# Patient Record
Sex: MALE | Race: WHITE | NOT HISPANIC OR LATINO | URBAN - METROPOLITAN AREA
[De-identification: names, ages, dates, MRNs, and addresses within clinical notes are randomized per-mention and may not be internally consistent; named-entity substitution may affect disease eponyms.]

---

## 2017-01-20 ENCOUNTER — OUTPATIENT (OUTPATIENT)
Dept: OUTPATIENT SERVICES | Facility: HOSPITAL | Age: 62
LOS: 1 days | End: 2017-01-20
Payer: COMMERCIAL

## 2017-01-20 VITALS
RESPIRATION RATE: 16 BRPM | WEIGHT: 199.96 LBS | DIASTOLIC BLOOD PRESSURE: 64 MMHG | HEIGHT: 72 IN | SYSTOLIC BLOOD PRESSURE: 120 MMHG | TEMPERATURE: 98 F | HEART RATE: 69 BPM | OXYGEN SATURATION: 98 %

## 2017-01-20 DIAGNOSIS — Z90.49 ACQUIRED ABSENCE OF OTHER SPECIFIED PARTS OF DIGESTIVE TRACT: Chronic | ICD-10-CM

## 2017-01-20 DIAGNOSIS — I35.0 NONRHEUMATIC AORTIC (VALVE) STENOSIS: ICD-10-CM

## 2017-01-20 DIAGNOSIS — Z98.890 OTHER SPECIFIED POSTPROCEDURAL STATES: Chronic | ICD-10-CM

## 2017-01-20 DIAGNOSIS — Z96.659 PRESENCE OF UNSPECIFIED ARTIFICIAL KNEE JOINT: Chronic | ICD-10-CM

## 2017-01-20 LAB
ALBUMIN SERPL ELPH-MCNC: 4.1 G/DL — SIGNIFICANT CHANGE UP (ref 3.3–5)
ALP SERPL-CCNC: 66 U/L — SIGNIFICANT CHANGE UP (ref 40–120)
ALT FLD-CCNC: 22 U/L RC — SIGNIFICANT CHANGE UP (ref 10–45)
ANION GAP SERPL CALC-SCNC: 11 MMOL/L — SIGNIFICANT CHANGE UP (ref 5–17)
AST SERPL-CCNC: 28 U/L — SIGNIFICANT CHANGE UP (ref 10–40)
BILIRUB SERPL-MCNC: 0.5 MG/DL — SIGNIFICANT CHANGE UP (ref 0.2–1.2)
BUN SERPL-MCNC: 24 MG/DL — HIGH (ref 7–23)
CALCIUM SERPL-MCNC: 9.1 MG/DL — SIGNIFICANT CHANGE UP (ref 8.4–10.5)
CHLORIDE SERPL-SCNC: 106 MMOL/L — SIGNIFICANT CHANGE UP (ref 96–108)
CO2 SERPL-SCNC: 23 MMOL/L — SIGNIFICANT CHANGE UP (ref 22–31)
CREAT SERPL-MCNC: 0.98 MG/DL — SIGNIFICANT CHANGE UP (ref 0.5–1.3)
GLUCOSE SERPL-MCNC: 115 MG/DL — HIGH (ref 70–99)
HCT VFR BLD CALC: 44.9 % — SIGNIFICANT CHANGE UP (ref 39–50)
HGB BLD-MCNC: 15 G/DL — SIGNIFICANT CHANGE UP (ref 13–17)
MCHC RBC-ENTMCNC: 31.1 PG — SIGNIFICANT CHANGE UP (ref 27–34)
MCHC RBC-ENTMCNC: 33.3 GM/DL — SIGNIFICANT CHANGE UP (ref 32–36)
MCV RBC AUTO: 93.4 FL — SIGNIFICANT CHANGE UP (ref 80–100)
PLATELET # BLD AUTO: 202 K/UL — SIGNIFICANT CHANGE UP (ref 150–400)
POTASSIUM SERPL-MCNC: 4.6 MMOL/L — SIGNIFICANT CHANGE UP (ref 3.5–5.3)
POTASSIUM SERPL-SCNC: 4.6 MMOL/L — SIGNIFICANT CHANGE UP (ref 3.5–5.3)
PROT SERPL-MCNC: 7.7 G/DL — SIGNIFICANT CHANGE UP (ref 6–8.3)
RBC # BLD: 4.81 M/UL — SIGNIFICANT CHANGE UP (ref 4.2–5.8)
RBC # FLD: 11.8 % — SIGNIFICANT CHANGE UP (ref 10.3–14.5)
SODIUM SERPL-SCNC: 140 MMOL/L — SIGNIFICANT CHANGE UP (ref 135–145)
WBC # BLD: 7.2 K/UL — SIGNIFICANT CHANGE UP (ref 3.8–10.5)
WBC # FLD AUTO: 7.2 K/UL — SIGNIFICANT CHANGE UP (ref 3.8–10.5)

## 2017-01-20 PROCEDURE — 76937 US GUIDE VASCULAR ACCESS: CPT

## 2017-01-20 PROCEDURE — 93460 R&L HRT ART/VENTRICLE ANGIO: CPT | Mod: 26,GC

## 2017-01-20 PROCEDURE — 93005 ELECTROCARDIOGRAM TRACING: CPT

## 2017-01-20 PROCEDURE — 93010 ELECTROCARDIOGRAM REPORT: CPT

## 2017-01-20 PROCEDURE — 80053 COMPREHEN METABOLIC PANEL: CPT

## 2017-01-20 PROCEDURE — 76937 US GUIDE VASCULAR ACCESS: CPT | Mod: 26,GC

## 2017-01-20 PROCEDURE — 85027 COMPLETE CBC AUTOMATED: CPT

## 2017-01-20 PROCEDURE — 93460 R&L HRT ART/VENTRICLE ANGIO: CPT

## 2017-01-20 NOTE — H&P CARDIOLOGY - HISTORY OF PRESENT ILLNESS
60 y/o Male with PMHx of, AS,  HTN, BPH, Arthritis presents with c/o CEJA, progressing over last 3 months and ent has diagnosed with AS, which is monitored by Cardiologist, Dr. Preston and the last Echo revealed the AS ( LESLEY is 0.82). Seen evuated  by cardiologist & recommends  or cardiac cath. 60 y/o Male with PMHx of, AS,  HTN, BPH, Arthritis presents with c/o CEJA, progressing over last 3 months and c/o occasional dizziness. Patient was diagnosed with AS, which is monitored by Cardiologist, Dr. Preston and the last Echo revealed the AS ( LESLEY is 0.82). Seen evaluated by cardiologist & recommends  or cardiac cath.

## 2017-01-20 NOTE — H&P CARDIOLOGY - PMH
Aortic stenosis    Arthritis    BPH (benign prostatic hyperplasia)    Guillain Barré syndrome  ( resolved)  HTN (hypertension)    HTN (hypertension)    Lupus    Varicose vein of leg

## 2017-01-24 ENCOUNTER — TRANSCRIPTION ENCOUNTER (OUTPATIENT)
Age: 62
End: 2017-01-24

## 2019-05-15 ENCOUNTER — INPATIENT (INPATIENT)
Facility: HOSPITAL | Age: 64
LOS: 4 days | Discharge: HOME | DRG: 195 | End: 2019-05-20
Attending: INTERNAL MEDICINE | Admitting: INTERNAL MEDICINE
Payer: COMMERCIAL

## 2019-05-15 VITALS
TEMPERATURE: 98 F | SYSTOLIC BLOOD PRESSURE: 121 MMHG | HEIGHT: 72 IN | RESPIRATION RATE: 20 BRPM | DIASTOLIC BLOOD PRESSURE: 73 MMHG | WEIGHT: 192.02 LBS | OXYGEN SATURATION: 96 % | HEART RATE: 90 BPM

## 2019-05-15 DIAGNOSIS — Z96.659 PRESENCE OF UNSPECIFIED ARTIFICIAL KNEE JOINT: Chronic | ICD-10-CM

## 2019-05-15 DIAGNOSIS — Z90.49 ACQUIRED ABSENCE OF OTHER SPECIFIED PARTS OF DIGESTIVE TRACT: Chronic | ICD-10-CM

## 2019-05-15 DIAGNOSIS — Z98.890 OTHER SPECIFIED POSTPROCEDURAL STATES: Chronic | ICD-10-CM

## 2019-05-15 LAB
ALBUMIN SERPL ELPH-MCNC: 4 G/DL — SIGNIFICANT CHANGE UP (ref 3.3–5)
ALP SERPL-CCNC: 57 U/L — SIGNIFICANT CHANGE UP (ref 40–120)
ALT FLD-CCNC: 39 U/L — SIGNIFICANT CHANGE UP (ref 10–45)
ANION GAP SERPL CALC-SCNC: 13 MMOL/L — SIGNIFICANT CHANGE UP (ref 5–17)
AST SERPL-CCNC: 39 U/L — SIGNIFICANT CHANGE UP (ref 10–40)
BILIRUB SERPL-MCNC: 1.7 MG/DL — HIGH (ref 0.2–1.2)
BUN SERPL-MCNC: 23 MG/DL — SIGNIFICANT CHANGE UP (ref 7–23)
CALCIUM SERPL-MCNC: 9.4 MG/DL — SIGNIFICANT CHANGE UP (ref 8.4–10.5)
CHLORIDE SERPL-SCNC: 99 MMOL/L — SIGNIFICANT CHANGE UP (ref 96–108)
CO2 SERPL-SCNC: 21 MMOL/L — LOW (ref 22–31)
CREAT SERPL-MCNC: 1.07 MG/DL — SIGNIFICANT CHANGE UP (ref 0.5–1.3)
GAS PNL BLDV: SIGNIFICANT CHANGE UP
GLUCOSE SERPL-MCNC: 117 MG/DL — HIGH (ref 70–99)
HCT VFR BLD CALC: 43.9 % — SIGNIFICANT CHANGE UP (ref 39–50)
HGB BLD-MCNC: 14.8 G/DL — SIGNIFICANT CHANGE UP (ref 13–17)
MCHC RBC-ENTMCNC: 31.5 PG — SIGNIFICANT CHANGE UP (ref 27–34)
MCHC RBC-ENTMCNC: 33.6 GM/DL — SIGNIFICANT CHANGE UP (ref 32–36)
MCV RBC AUTO: 93.6 FL — SIGNIFICANT CHANGE UP (ref 80–100)
PLATELET # BLD AUTO: 198 K/UL — SIGNIFICANT CHANGE UP (ref 150–400)
POTASSIUM SERPL-MCNC: 4.3 MMOL/L — SIGNIFICANT CHANGE UP (ref 3.5–5.3)
POTASSIUM SERPL-SCNC: 4.3 MMOL/L — SIGNIFICANT CHANGE UP (ref 3.5–5.3)
PROT SERPL-MCNC: 7.6 G/DL — SIGNIFICANT CHANGE UP (ref 6–8.3)
RBC # BLD: 4.68 M/UL — SIGNIFICANT CHANGE UP (ref 4.2–5.8)
RBC # FLD: 12.1 % — SIGNIFICANT CHANGE UP (ref 10.3–14.5)
SODIUM SERPL-SCNC: 133 MMOL/L — LOW (ref 135–145)
WBC # BLD: 17.4 K/UL — HIGH (ref 3.8–10.5)
WBC # FLD AUTO: 17.4 K/UL — HIGH (ref 3.8–10.5)

## 2019-05-15 PROCEDURE — 99285 EMERGENCY DEPT VISIT HI MDM: CPT | Mod: 25

## 2019-05-15 PROCEDURE — 71046 X-RAY EXAM CHEST 2 VIEWS: CPT | Mod: 26

## 2019-05-15 PROCEDURE — 93010 ELECTROCARDIOGRAM REPORT: CPT

## 2019-05-15 RX ORDER — SODIUM CHLORIDE 9 MG/ML
1000 INJECTION INTRAMUSCULAR; INTRAVENOUS; SUBCUTANEOUS ONCE
Refills: 0 | Status: COMPLETED | OUTPATIENT
Start: 2019-05-15 | End: 2019-05-15

## 2019-05-15 RX ORDER — ACETAMINOPHEN 500 MG
975 TABLET ORAL ONCE
Refills: 0 | Status: COMPLETED | OUTPATIENT
Start: 2019-05-15 | End: 2019-05-15

## 2019-05-15 RX ADMIN — Medication 975 MILLIGRAM(S): at 23:22

## 2019-05-15 RX ADMIN — SODIUM CHLORIDE 2000 MILLILITER(S): 9 INJECTION INTRAMUSCULAR; INTRAVENOUS; SUBCUTANEOUS at 23:22

## 2019-05-15 NOTE — ED CLERICAL - NS ED CLERK NOTE PRE-ARRIVAL INFORMATION; ADDITIONAL PRE-ARRIVAL INFORMATION
CC/Reason For referral: fever, productive cough   Preferred Consultant(if applicable): na  Who admits for you (if needed): hospitalist  Do you have documents you would like to fax over?no  Would you still like to speak to an ED attending? Call after pt evaluated  pt has completed dose or tamiflu

## 2019-05-15 NOTE — ED ADULT NURSE NOTE - OBJECTIVE STATEMENT
63y male arrived to ED complaining of SOB. Patient PMHx SLE, arthritis, GBS, AVR. Patient sent to ED by PMD for concern PNA. Patient recently tx empirically for flu with tamiflu. Patient reports that cough, weakness, CEJA worsened throughout the week. Patient denies CP, n/v/d, ab pain, chills, fever. Patient A&Ox4, ambulatory, VS stable.

## 2019-05-15 NOTE — ED PROVIDER NOTE - OBJECTIVE STATEMENT
63 yoM, PMHx of SLE, arthritis, remote GBS, s/p bioprosthetic AVR presents to ED after being sent in by PCP (Dr. Alfaro) for concern for post viral PNA. Sick last week with viral syndrome, Given Tamiflu for clinical flu on Wednesday 1 week ago, finished 2 days ago. Was feeling a little better but yesterday sx returned and more significant. Cough, dyspnea, weakness. 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 No vomiting or diarrhea. Started on Levaquin this afternoon but upon being seen again by PCP (neighbor) referred to ED for admission given worsening clinical status.

## 2019-05-15 NOTE — ED PROVIDER NOTE - CLINICAL SUMMARY MEDICAL DECISION MAKING FREE TEXT BOX
Abad PGY1 63 yoM, SLE, arthritis, s/p AVR, remote GBS, 1 week of viral syndrome, s/p tamilfu, worse sx yesterday, started levaquin, not improving, concern for post viral PNA  RLL rhonchi, looks ill, abd soft, normal neuro exam  levaquin, ivf, tylenol, labs, admit to hospitalist per Dr. Alfaro

## 2019-05-15 NOTE — ED PROVIDER NOTE - ATTENDING CONTRIBUTION TO CARE
Patient is a 62 yo M with history of lupus, HTN, s/p prosthetic AVR s/p treatment with tamiflu for viral syndrome now p/w cough, shortness of breath and weakness. Patient sent in by his pcp, Dr. Alfaro for concern for pneumonia.     VS noted  Gen. no acute distress, Non toxic   HEENT: EOMI, mmm,   Lungs:   CVS: RRR   Abd; Soft non tender, non distended   Ext: no edema  Skin: no rash  Neuro AAOx3 non focal clear speech  a/p: r/o pneumonia - labs, CXR, ekg, Abx and likely admit.   - Sahara SARAVIA Patient is a 62 yo M with history of lupus, HTN, s/p prosthetic AVR s/p treatment with tamiflu for viral syndrome now p/w cough, shortness of breath and weakness. Patient sent in by his pcp, Dr. Alfaro for concern for pneumonia.     VS noted  Gen. no acute distress, Non toxic   HEENT: EOMI, mmm,   Lungs: rhonchi in RLL  CVS: RRR   Abd; Soft non tender, non distended   Ext: no edema  Skin: no rash  Neuro AAOx3 non focal clear speech  a/p:  pneumonia - labs, CXR, ekg, Abx and admit.   - Sahara SARAVIA Patient is a 62 yo M with history of lupus, HTN, s/p prosthetic AVR s/p treatment with tamiflu for viral syndrome now p/w cough, shortness of breath and weakness. Patient had fevers last week from Wednesday to Saturday. He states he went to work today and was wiped out and left early to see his pcp. An outpatient CXR was done which showed pneumonia in the RLL. Patient sent in by his pcp, Dr. Alfaro for pneumonia and IV antibiotics.     VS noted  Gen. no acute distress, Non toxic   HEENT: EOMI, mmm  Lungs: rhonchi in RLL  CVS: RRR   Abd; Soft non tender, non distended   Ext: no edema  Skin: no rash  Neuro AAOx3 non focal clear speech  a/p:  pneumonia - labs, CXR, ekg, Abx and admit.   - Sahara SARAVIA

## 2019-05-15 NOTE — ED PROVIDER NOTE - PROGRESS NOTE DETAILS
Dr. Alfaro called and asked that patient be admitted to him, but ask hospitalist to cover. Haverty PGY!- d/w with Dr. Villela, will do admission for Dr. Alfaro, requests legionella antigen sent

## 2019-05-15 NOTE — ED PROVIDER NOTE - PHYSICAL EXAMINATION
PHYSICAL EXAM:  GENERAL: looks uncomfortable, well-groomed, well-developed  HEAD:  Atraumatic, Normocephalic  EYES: EOMI, PERRLA, conjunctiva and sclera clear  ENMT: airway patent, uvula midline  NECK: Supple, No JVD  HEART: Regular rate and rhythm; No murmurs, rubs, or gallops  RESPIRATORY: rhonchi RLL, coughing, no resp distress   ABDOMEN: Soft, Nontender, Nondistended  NEURO: A&Ox3, nonfocal, moving all extremities  EXTREMITIES:  2+ Peripheral Pulses, No clubbing, cyanosis, or edema  SKIN: warm, dry, normal color, no rash

## 2019-05-16 DIAGNOSIS — J18.1 LOBAR PNEUMONIA, UNSPECIFIED ORGANISM: ICD-10-CM

## 2019-05-16 DIAGNOSIS — J18.9 PNEUMONIA, UNSPECIFIED ORGANISM: ICD-10-CM

## 2019-05-16 DIAGNOSIS — Z29.9 ENCOUNTER FOR PROPHYLACTIC MEASURES, UNSPECIFIED: ICD-10-CM

## 2019-05-16 DIAGNOSIS — Z95.4 PRESENCE OF OTHER HEART-VALVE REPLACEMENT: ICD-10-CM

## 2019-05-16 PROBLEM — M19.90 UNSPECIFIED OSTEOARTHRITIS, UNSPECIFIED SITE: Chronic | Status: ACTIVE | Noted: 2017-01-20

## 2019-05-16 PROBLEM — M32.9 SYSTEMIC LUPUS ERYTHEMATOSUS, UNSPECIFIED: Chronic | Status: ACTIVE | Noted: 2017-01-20

## 2019-05-16 PROBLEM — G61.0 GUILLAIN-BARRE SYNDROME: Chronic | Status: ACTIVE | Noted: 2017-01-20

## 2019-05-16 PROBLEM — I35.0 NONRHEUMATIC AORTIC (VALVE) STENOSIS: Chronic | Status: ACTIVE | Noted: 2017-01-20

## 2019-05-16 PROBLEM — N40.0 BENIGN PROSTATIC HYPERPLASIA WITHOUT LOWER URINARY TRACT SYMPTOMS: Chronic | Status: ACTIVE | Noted: 2017-01-20

## 2019-05-16 PROBLEM — I10 ESSENTIAL (PRIMARY) HYPERTENSION: Chronic | Status: ACTIVE | Noted: 2017-01-20

## 2019-05-16 PROBLEM — I83.90 ASYMPTOMATIC VARICOSE VEINS OF UNSPECIFIED LOWER EXTREMITY: Chronic | Status: ACTIVE | Noted: 2017-01-20

## 2019-05-16 LAB
ANION GAP SERPL CALC-SCNC: 9 MMOL/L — SIGNIFICANT CHANGE UP (ref 5–17)
BASOPHILS # BLD AUTO: 0 K/UL — SIGNIFICANT CHANGE UP (ref 0–0.2)
BASOPHILS # BLD AUTO: 0 K/UL — SIGNIFICANT CHANGE UP (ref 0–0.2)
BASOPHILS NFR BLD AUTO: 0 % — SIGNIFICANT CHANGE UP (ref 0–2)
BUN SERPL-MCNC: 18 MG/DL — SIGNIFICANT CHANGE UP (ref 7–23)
CALCIUM SERPL-MCNC: 9 MG/DL — SIGNIFICANT CHANGE UP (ref 8.4–10.5)
CHLORIDE SERPL-SCNC: 104 MMOL/L — SIGNIFICANT CHANGE UP (ref 96–108)
CO2 SERPL-SCNC: 22 MMOL/L — SIGNIFICANT CHANGE UP (ref 22–31)
CREAT SERPL-MCNC: 0.99 MG/DL — SIGNIFICANT CHANGE UP (ref 0.5–1.3)
EOSINOPHIL # BLD AUTO: 0 K/UL — SIGNIFICANT CHANGE UP (ref 0–0.5)
EOSINOPHIL # BLD AUTO: 0.1 K/UL — SIGNIFICANT CHANGE UP (ref 0–0.5)
EOSINOPHIL NFR BLD AUTO: 0 % — SIGNIFICANT CHANGE UP (ref 0–6)
GLUCOSE SERPL-MCNC: 111 MG/DL — HIGH (ref 70–99)
HCT VFR BLD CALC: 42.2 % — SIGNIFICANT CHANGE UP (ref 39–50)
HGB BLD-MCNC: 14.1 G/DL — SIGNIFICANT CHANGE UP (ref 13–17)
LEGIONELLA AG UR QL: NEGATIVE — SIGNIFICANT CHANGE UP
LYMPHOCYTES # BLD AUTO: 1.2 K/UL — SIGNIFICANT CHANGE UP (ref 1–3.3)
LYMPHOCYTES # BLD AUTO: 1.88 K/UL — SIGNIFICANT CHANGE UP (ref 1–3.3)
LYMPHOCYTES # BLD AUTO: 11.2 % — LOW (ref 13–44)
LYMPHOCYTES # BLD AUTO: 7 % — LOW (ref 13–44)
MCHC RBC-ENTMCNC: 31.1 PG — SIGNIFICANT CHANGE UP (ref 27–34)
MCHC RBC-ENTMCNC: 33.4 GM/DL — SIGNIFICANT CHANGE UP (ref 32–36)
MCV RBC AUTO: 93 FL — SIGNIFICANT CHANGE UP (ref 80–100)
MONOCYTES # BLD AUTO: 1.16 K/UL — HIGH (ref 0–0.9)
MONOCYTES # BLD AUTO: 1.4 K/UL — HIGH (ref 0–0.9)
MONOCYTES NFR BLD AUTO: 6.9 % — SIGNIFICANT CHANGE UP (ref 2–14)
MONOCYTES NFR BLD AUTO: 8 % — SIGNIFICANT CHANGE UP (ref 2–14)
NEUTROPHILS # BLD AUTO: 13.78 K/UL — HIGH (ref 1.8–7.4)
NEUTROPHILS # BLD AUTO: 14.7 K/UL — HIGH (ref 1.8–7.4)
NEUTROPHILS NFR BLD AUTO: 77 % — SIGNIFICANT CHANGE UP (ref 43–77)
NEUTROPHILS NFR BLD AUTO: 79.3 % — HIGH (ref 43–77)
PLATELET # BLD AUTO: 170 K/UL — SIGNIFICANT CHANGE UP (ref 150–400)
POTASSIUM SERPL-MCNC: 3.9 MMOL/L — SIGNIFICANT CHANGE UP (ref 3.5–5.3)
POTASSIUM SERPL-SCNC: 3.9 MMOL/L — SIGNIFICANT CHANGE UP (ref 3.5–5.3)
RAPID RVP RESULT: SIGNIFICANT CHANGE UP
RBC # BLD: 4.54 M/UL — SIGNIFICANT CHANGE UP (ref 4.2–5.8)
RBC # FLD: 13.2 % — SIGNIFICANT CHANGE UP (ref 10.3–14.5)
SODIUM SERPL-SCNC: 135 MMOL/L — SIGNIFICANT CHANGE UP (ref 135–145)
WBC # BLD: 16.83 K/UL — HIGH (ref 3.8–10.5)
WBC # FLD AUTO: 16.83 K/UL — HIGH (ref 3.8–10.5)

## 2019-05-16 PROCEDURE — 99223 1ST HOSP IP/OBS HIGH 75: CPT

## 2019-05-16 RX ORDER — ASPIRIN/CALCIUM CARB/MAGNESIUM 324 MG
81 TABLET ORAL DAILY
Refills: 0 | Status: DISCONTINUED | OUTPATIENT
Start: 2019-05-16 | End: 2019-05-20

## 2019-05-16 RX ORDER — POLYETHYLENE GLYCOL 3350 17 G/17G
17 POWDER, FOR SOLUTION ORAL DAILY
Refills: 0 | Status: DISCONTINUED | OUTPATIENT
Start: 2019-05-16 | End: 2019-05-20

## 2019-05-16 RX ORDER — DOCUSATE SODIUM 100 MG
100 CAPSULE ORAL THREE TIMES A DAY
Refills: 0 | Status: DISCONTINUED | OUTPATIENT
Start: 2019-05-16 | End: 2019-05-20

## 2019-05-16 RX ORDER — IBUPROFEN 200 MG
600 TABLET ORAL ONCE
Refills: 0 | Status: COMPLETED | OUTPATIENT
Start: 2019-05-16 | End: 2019-05-16

## 2019-05-16 RX ORDER — ENOXAPARIN SODIUM 100 MG/ML
40 INJECTION SUBCUTANEOUS EVERY 24 HOURS
Refills: 0 | Status: DISCONTINUED | OUTPATIENT
Start: 2019-05-16 | End: 2019-05-20

## 2019-05-16 RX ORDER — RAMIPRIL 5 MG
0 CAPSULE ORAL
Qty: 0 | Refills: 0 | DISCHARGE

## 2019-05-16 RX ORDER — SENNA PLUS 8.6 MG/1
2 TABLET ORAL AT BEDTIME
Refills: 0 | Status: DISCONTINUED | OUTPATIENT
Start: 2019-05-16 | End: 2019-05-20

## 2019-05-16 RX ORDER — VANCOMYCIN HCL 1 G
1250 VIAL (EA) INTRAVENOUS EVERY 12 HOURS
Refills: 0 | Status: DISCONTINUED | OUTPATIENT
Start: 2019-05-16 | End: 2019-05-18

## 2019-05-16 RX ORDER — ACETAMINOPHEN 500 MG
650 TABLET ORAL EVERY 6 HOURS
Refills: 0 | Status: DISCONTINUED | OUTPATIENT
Start: 2019-05-16 | End: 2019-05-20

## 2019-05-16 RX ORDER — TAMSULOSIN HYDROCHLORIDE 0.4 MG/1
1 CAPSULE ORAL
Qty: 0 | Refills: 0 | DISCHARGE

## 2019-05-16 RX ORDER — SODIUM CHLORIDE 9 MG/ML
1000 INJECTION INTRAMUSCULAR; INTRAVENOUS; SUBCUTANEOUS
Refills: 0 | Status: DISCONTINUED | OUTPATIENT
Start: 2019-05-16 | End: 2019-05-20

## 2019-05-16 RX ADMIN — Medication 100 MILLIGRAM(S): at 11:44

## 2019-05-16 RX ADMIN — Medication 650 MILLIGRAM(S): at 11:44

## 2019-05-16 RX ADMIN — Medication 600 MILLIGRAM(S): at 01:03

## 2019-05-16 RX ADMIN — Medication 650 MILLIGRAM(S): at 12:40

## 2019-05-16 RX ADMIN — ENOXAPARIN SODIUM 40 MILLIGRAM(S): 100 INJECTION SUBCUTANEOUS at 17:29

## 2019-05-16 RX ADMIN — SODIUM CHLORIDE 100 MILLILITER(S): 9 INJECTION INTRAMUSCULAR; INTRAVENOUS; SUBCUTANEOUS at 06:05

## 2019-05-16 RX ADMIN — SENNA PLUS 2 TABLET(S): 8.6 TABLET ORAL at 23:09

## 2019-05-16 RX ADMIN — Medication 166.67 MILLIGRAM(S): at 06:12

## 2019-05-16 RX ADMIN — Medication 650 MILLIGRAM(S): at 18:46

## 2019-05-16 RX ADMIN — Medication 100 MILLIGRAM(S): at 23:08

## 2019-05-16 RX ADMIN — Medication 166.67 MILLIGRAM(S): at 17:55

## 2019-05-16 RX ADMIN — Medication 81 MILLIGRAM(S): at 11:44

## 2019-05-16 RX ADMIN — SODIUM CHLORIDE 1000 MILLILITER(S): 9 INJECTION INTRAMUSCULAR; INTRAVENOUS; SUBCUTANEOUS at 00:20

## 2019-05-16 RX ADMIN — Medication 650 MILLIGRAM(S): at 17:53

## 2019-05-16 RX ADMIN — Medication 975 MILLIGRAM(S): at 00:20

## 2019-05-16 NOTE — CONSULT NOTE ADULT - SUBJECTIVE AND OBJECTIVE BOX
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      PMHX:  Lupus  Arthritis  HTN (hypertension)  Varicose vein of leg  Aortic stenosis  HTN (hypertension)  BPH (benign prostatic hyperplasia)  Guillain Barré syndrome      PSHX:  Inguinal hernia repair  Total knee arthroplasty  Cholecystectomy      FAMILY HISTORY:  mother - lung cancer    SOCIAL HISTORY:  physical therapist    Pulmonary Medications:       Antimicrobials:  levoFLOXacin IVPB 750 milliGRAM(s) IV Intermittent every 24 hours  vancomycin  IVPB 1250 milliGRAM(s) IV Intermittent every 12 hours      Cardiology:      Other:  aspirin enteric coated 81 milliGRAM(s) Oral daily  docusate sodium 100 milliGRAM(s) Oral three times a day  enoxaparin Injectable 40 milliGRAM(s) SubCutaneous every 24 hours  senna 2 Tablet(s) Oral at bedtime  sodium chloride 0.9%. 1000 milliLiter(s) IV Continuous <Continuous>      Prn:  MEDICATIONS  (PRN):  acetaminophen   Tablet .. 650 milliGRAM(s) Oral every 6 hours PRN Moderate Pain (4 - 6)  polyethylene glycol 3350 17 Gram(s) Oral daily PRN Constipation      Allergies    penicillins (Other)    HOME MEDICATIONS: see  H & P    REVIEW OF SYSTEMS:  Constitutional: As per HPI  HEENT: Within normal limits  CV: As per HPI  Resp: As per HPI  GI: Within normal limits   : Within normal limits  Musculoskeletal: Within normal limits  Skin: Within normal limits  Neurological: Within normal limits  Psychiatric: Within normal limits  Endocrine: Within normal limits  Hematologic/Lymphatic: Within normal limits  Allergic/Immunologic: Within normal limits    [x] All other systems negative    OBJECTIVE:    I&O's Detail    16 May 2019 07:01  -  16 May 2019 15:06  --------------------------------------------------------  IN:    Oral Fluid: 600 mL  Total IN: 600 mL    OUT:  Total OUT: 0 mL    Total NET: 600 mL    Daily Height in cm: 182.88 (16 May 2019 07:55)    Daily Weight in k.1 (16 May 2019 07:55)      PHYSICAL EXAM:  ICU Vital Signs Last 24 Hrs  T(C): 37 (16 May 2019 14:04), Max: 39.1 (16 May 2019 00:10)  T(F): 98.6 (16 May 2019 14:04), Max: 102.3 (16 May 2019 00:10)  HR: 83 (16 May 2019 14:04) (82 - 94)  BP: 111/68 (16 May 2019 14:04) (111/68 - 126/76)  BP(mean): --  ABP: --  ABP(mean): --  RR: 18 (16 May 2019 14:04) (17 - 20)  SpO2: 93% (16 May 2019 14:04) (93% - 96%)    General: Awake. Alert. Cooperative. No distress. Appears stated age 	  HEENT:   Atraumatic. Normocephalic. Anicteric. Normal oral mucosa. PERRL. EOMI.  Neck: Supple. Trachea midline. Thyroid without enlargement/tenderness/nodules. No carotid bruit. No JVD.	  Cardiovascular: Regular rate and rhythm. S1 S2 normal. No murmurs, rubs or gallops.  Respiratory: Respirations unlabored. Clear to auscultation and percussion bilaterally. No curvature.  Abdomen: Soft. Non-tender. Non-distended. No organomegaly. No masses. Normal bowel sounds.  Extremities: Warm to touch. No clubbing or cyanosis. No pedal edema.  Pulses: 2+ peripheral pulses all extremities.	  Skin: Normal skin color. No rashes or lesions. No ecchymoses. No cyanosis. Warm to touch.  Lymph Nodes: Cervical, supraclavicular and axillary nodes normal  Neurological: Motor and sensory examination equal and normal. A and O x 3  Psychiatry: Appropriate mood and affect.      LABS:                          14.1   16.83 )-----------( 170      ( 16 May 2019 09:02 )             42.2     CBC    WBC  16.83 <==, 17.4 <==    Hemoglobin  14.1 <<==, 14.8 <<==    Hematocrit  42.2 <==, 43.9 <==    Platelets  170 <==, 198 <==      135  |  104  |  18  ----------------------------<  111<H>    05-16  3.9   |  22  |  0.99    LYTES    sodium  135 <==, 133 <==    potassium   3.9 <==, 4.3 <==    chloride  104 <==, 99 <==    carbon dioxide  22 <==, 21 <==    =============================================================================================  RENAL FUNCTION:    Creatinine:   0.99  <<==, 1.07  <<==    BUN:   18 <==, 23 <==    ============================================================================================    calcium   9.0 <==, 9.4 <==    phos       mag       ============================================================================================  LFTs    AST:   39 <==     ALT:  39  <==     AP:  57  <=    Bili:  1.7  <=      Venous Blood Gas:  05-15 @ 23:07  7.43/37/43/24/76  VBG Lactate: 2.1                  MICROBIOLOGY:     Rapid Respiratory Viral Panel (19 @ 00:12)    Rapid RVP Result: NotDetec: This Respiratory Panel uses polymerase chain reaction (PCR) to detect for  adenovirus; coronavirus (HKU1, NL63, 229E, OC43); human metapneumovirus  (hMPV); human enterovirus/rhinovirus (Entero/RV); influenza A; influenza  A/H1; influenza A/H3; influenza A/H1-2009; influenza B; parainfluenza  viruses 1, 2, 3, 4; respiratory syncytial virus; Mycoplasma pneumoniae;  and Chlamydophila pneumoniae.    RADIOLOGY:  [x ] Chest radiographs reviewed and interpreted by me    EXAM:  XR CHEST PA LAT 2V                          PROCEDURE DATE:  05/15/2019      INTERPRETATION:  CLINICAL INFORMATION: cough    EXAM: PA and lateral chest radiographs    COMPARISON: Chest radiograph from 2008.    FINDINGS:    Status post median sternotomy and aortic valve replacement. Retained   epicardial pacer leads are noted.  Right lower lobe opacity consistent with pneumonia.  The cardiac silhouette is within normal limits.  There is no acute abnormality of the visualized osseous structures.    IMPRESSION:     Right lower lobe pneumonia. Follow-up to resolution.    MARC AREVALO M.D., RADIOLOGY RESIDENT  This document has been electronically signed.  HAYLIE DYSON M.D., ATTENDING RADIOLOGIST  This document has been electronically signed. May 16 2019  8:56AM  --------------------------------------------------------------------------------------------------------------- [facility] - [facility] - CONSULT NOTE    HPI: 63 year old gentleman, lifelong non-smoker, with history of "SLE" based on a malar rash and positive MANUEL - he has never had arthritis and is not maintained on chronic immunosuppressive therapy. 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 The patient developed fatigue, malaise and weakness associated with rhinorrhea, nasal congestion, post nasal drip and cough ~ 1 week ago while at work. He was prescribed Tamiflu over the phone for presumed influenza with improvement in his symptoms over the next several days. Yesterday, the patient again developed fatigue, malaise, muscle aches and weakness now associated with a hacking cough productive of scant sputum, right sided chest pain exacerbated by deep inspiration, fever -> 103.5 degrees and chills. He was seen by Dr. Alfaro and referred to the ER after CXR revealed pneumonia. He has no shortness of breath, chest congestion or wheeze. No anterior chest pain/pressure or palpitations. Asked to evaluate    PMHX:  Aortic stenosis  Lupus  Arthritis  HTN (hypertension)  Varicose vein of leg  BPH (benign prostatic hyperplasia)  Guillain Barré syndrome      PSHX:  Inguinal hernia repair  Arthroscopy - ACL repair  Cholecystectomy      FAMILY HISTORY:  mother - lung cancer    SOCIAL HISTORY:  physical therapist    Pulmonary Medications:       Antimicrobials:  levoFLOXacin IVPB 750 milliGRAM(s) IV Intermittent every 24 hours  vancomycin  IVPB 1250 milliGRAM(s) IV Intermittent every 12 hours      Cardiology:      Other:  aspirin enteric coated 81 milliGRAM(s) Oral daily  docusate sodium 100 milliGRAM(s) Oral three times a day  enoxaparin Injectable 40 milliGRAM(s) SubCutaneous every 24 hours  senna 2 Tablet(s) Oral at bedtime  sodium chloride 0.9%. 1000 milliLiter(s) IV Continuous <Continuous>      Prn:  MEDICATIONS  (PRN):  acetaminophen   Tablet .. 650 milliGRAM(s) Oral every 6 hours PRN Moderate Pain (4 - 6)  polyethylene glycol 3350 17 Gram(s) Oral daily PRN Constipation      Allergies    penicillins (Other)    HOME MEDICATIONS: see  H & P    REVIEW OF SYSTEMS:  Constitutional: As per HPI  HEENT: Within normal limits  CV: As per HPI  Resp: As per HPI  GI: Within normal limits   : Within normal limits  Musculoskeletal: right sided chest pain  Skin: Within normal limits  Neurological: Within normal limits  Psychiatric: Within normal limits  Endocrine: Within normal limits  Hematologic/Lymphatic: Within normal limits  Allergic/Immunologic: Within normal limits    [x] All other systems negative    OBJECTIVE:    I&O's Detail    16 May 2019 07:01  -  16 May 2019 15:06  --------------------------------------------------------  IN:    Oral Fluid: 600 mL  Total IN: 600 mL    OUT:  Total OUT: 0 mL    Total NET: 600 mL    Daily Height in cm: 182.88 (16 May 2019 07:55)    Daily Weight in k.1 (16 May 2019 07:55)      PHYSICAL EXAM:  ICU Vital Signs Last 24 Hrs  T(C): 37 (16 May 2019 14:04), Max: 39.1 (16 May 2019 00:10)  T(F): 98.6 (16 May 2019 14:04), Max: 102.3 (16 May 2019 00:10)  HR: 83 (16 May 2019 14:04) (82 - 94)  BP: 111/68 (16 May 2019 14:04) (111/68 - 126/76)  BP(mean): --  ABP: --  ABP(mean): --  RR: 18 (16 May 2019 14:04) (17 - 20)  SpO2: 93% (16 May 2019 14:04) (93% - 96%) on room air    General: Awake. Alert. Cooperative. No distress. Appears stated age. Diaphoretic 	  HEENT: Atraumatic. Normocephalic. Anicteric. Normal oral mucosa. PERRL. EOMI.  Neck: Supple. Trachea midline. Thyroid without enlargement/tenderness/nodules. No carotid bruit. No JVD.	  Cardiovascular: Regular rate and rhythm. S1 S2 normal. II/VI systolic murmur  Respiratory: Respirations unlabored. Right basilar rales. Pain with inspiration. No curvature.  Abdomen: Soft. Non-tender. Non-distended. No organomegaly. No masses. Normal bowel sounds.  Extremities: Warm to touch. No clubbing or cyanosis. No pedal edema.  Pulses: 2+ peripheral pulses all extremities.	  Skin: Normal skin color. No rashes or lesions. No ecchymoses. No cyanosis. Warm to touch.  Lymph Nodes: Cervical, supraclavicular and axillary nodes normal  Neurological: Motor and sensory examination equal and normal. A and O x 3  Psychiatry: Appropriate mood and affect.      LABS:                          14.1   16.83 )-----------( 170      ( 16 May 2019 09:02 )             42.2     CBC    WBC  16.83 <==, 17.4 <==    Hemoglobin  14.1 <<==, 14.8 <<==    Hematocrit  42.2 <==, 43.9 <==    Platelets  170 <==, 198 <==      135  |  104  |  18  ----------------------------<  111<H>    05-16  3.9   |  22  |  0.99    LYTES    sodium  135 <==, 133 <==    potassium   3.9 <==, 4.3 <==    chloride  104 <==, 99 <==    carbon dioxide  22 <==, 21 <==    =============================================================================================  RENAL FUNCTION:    Creatinine:   0.99  <<==, 1.07  <<==    BUN:   18 <==, 23 <==    ============================================================================================    calcium   9.0 <==, 9.4 <==    phos       mag       ============================================================================================  LFTs    AST:   39 <==     ALT:  39  <==     AP:  57  <=    Bili:  1.7  <=      Venous Blood Gas:  05-15 @ 23:07  7.43/37/43/24/76  VBG Lactate: 2.1    MICROBIOLOGY:     Rapid Respiratory Viral Panel (19 @ 00:12)    Rapid RVP Result: NotDete: This Respiratory Panel uses polymerase chain reaction (PCR) to detect for  adenovirus; coronavirus (HKU1, NL63, 229E, OC43); human metapneumovirus  (hMPV); human enterovirus/rhinovirus (Entero/RV); influenza A; influenza  A/H1; influenza A/H3; influenza A/H1-2009; influenza B; parainfluenza  viruses 1, 2, 3, 4; respiratory syncytial virus; Mycoplasma pneumoniae;  and Chlamydophila pneumoniae.    RADIOLOGY:  [x ] Chest radiographs reviewed and interpreted by me    EXAM:  XR CHEST PA LAT 2V                          PROCEDURE DATE:  05/15/2019      INTERPRETATION:  CLINICAL INFORMATION: cough    EXAM: PA and lateral chest radiographs    COMPARISON: Chest radiograph from 2008.    FINDINGS:    Status post median sternotomy and aortic valve replacement. Retained   epicardial pacer leads are noted.  Right lower lobe opacity consistent with pneumonia.  The cardiac silhouette is within normal limits.  There is no acute abnormality of the visualized osseous structures.    IMPRESSION:     Right lower lobe pneumonia. Follow-up to resolution.    MARC AREVALO M.D., RADIOLOGY RESIDENT  This document has been electronically signed.  HAYLIE DYSON M.D., ATTENDING RADIOLOGIST  This document has been electronically signed. May 16 2019  8:56AM  --------------------------------------------------------------------------------------------------------------- [facility] - [facility] - CONSULT NOTE    HPI: 63 year old gentleman, lifelong non-smoker, with history of "SLE" based on a malar rash and positive MANUEL - he has never had arthritis and is not maintained on chronic immunosuppressive therapy. 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 The patient developed fatigue, malaise and weakness associated with rhinorrhea, nasal congestion, post nasal drip and cough ~ 1 week ago while at work. He was prescribed Tamiflu over the phone for presumed influenza with improvement in his symptoms over the next several days. Yesterday, the patient again developed fatigue, malaise, muscle aches and weakness now associated with a hacking cough productive of scant sputum, right sided chest pain exacerbated by deep inspiration, fever -> 103.5 degrees and chills. He was seen by Dr. Alfaro and referred to the ER after CXR revealed pneumonia. He has no shortness of breath, chest congestion or wheeze. No anterior chest pain/pressure or palpitations. Asked to evaluate    PMHX:  Aortic stenosis  Lupus  Arthritis  HTN (hypertension)  Varicose vein of leg  BPH (benign prostatic hyperplasia)  Guillain Barré syndrome      PSHX:  Inguinal hernia repair  Arthroscopy - ACL repair  Cholecystectomy      FAMILY HISTORY:  mother - lung cancer    SOCIAL HISTORY:  physical therapist; lifelong non-smoker    Pulmonary Medications:       Antimicrobials:  levoFLOXacin IVPB 750 milliGRAM(s) IV Intermittent every 24 hours  vancomycin  IVPB 1250 milliGRAM(s) IV Intermittent every 12 hours      Cardiology:      Other:  aspirin enteric coated 81 milliGRAM(s) Oral daily  docusate sodium 100 milliGRAM(s) Oral three times a day  enoxaparin Injectable 40 milliGRAM(s) SubCutaneous every 24 hours  senna 2 Tablet(s) Oral at bedtime  sodium chloride 0.9%. 1000 milliLiter(s) IV Continuous <Continuous>      Prn:  MEDICATIONS  (PRN):  acetaminophen   Tablet .. 650 milliGRAM(s) Oral every 6 hours PRN Moderate Pain (4 - 6)  polyethylene glycol 3350 17 Gram(s) Oral daily PRN Constipation      Allergies    penicillins (Other)    HOME MEDICATIONS: see  H & P    REVIEW OF SYSTEMS:  Constitutional: As per HPI  HEENT: Within normal limits  CV: As per HPI  Resp: As per HPI  GI: Within normal limits   : Within normal limits  Musculoskeletal: right sided chest pain  Skin: Within normal limits  Neurological: Within normal limits  Psychiatric: Within normal limits  Endocrine: Within normal limits  Hematologic/Lymphatic: Within normal limits  Allergic/Immunologic: Within normal limits    [x] All other systems negative    OBJECTIVE:    I&O's Detail    16 May 2019 07:01  -  16 May 2019 15:06  --------------------------------------------------------  IN:    Oral Fluid: 600 mL  Total IN: 600 mL    OUT:  Total OUT: 0 mL    Total NET: 600 mL    Daily Height in cm: 182.88 (16 May 2019 07:55)    Daily Weight in k.1 (16 May 2019 07:55)      PHYSICAL EXAM:  ICU Vital Signs Last 24 Hrs  T(C): 37 (16 May 2019 14:04), Max: 39.1 (16 May 2019 00:10)  T(F): 98.6 (16 May 2019 14:04), Max: 102.3 (16 May 2019 00:10)  HR: 83 (16 May 2019 14:04) (82 - 94)  BP: 111/68 (16 May 2019 14:04) (111/68 - 126/76)  BP(mean): --  ABP: --  ABP(mean): --  RR: 18 (16 May 2019 14:04) (17 - 20)  SpO2: 93% (16 May 2019 14:04) (93% - 96%) on room air    General: Awake. Alert. Cooperative. No distress. Appears stated age. Diaphoretic 	  HEENT: Atraumatic. Normocephalic. Anicteric. Normal oral mucosa. PERRL. EOMI.  Neck: Supple. Trachea midline. Thyroid without enlargement/tenderness/nodules. No carotid bruit. No JVD.	  Cardiovascular: Regular rate and rhythm. S1 S2 normal. II/VI systolic murmur  Respiratory: Respirations unlabored. Right basilar rales. Pain with inspiration. No curvature.  Abdomen: Soft. Non-tender. Non-distended. No organomegaly. No masses. Normal bowel sounds.  Extremities: Warm to touch. No clubbing or cyanosis. No pedal edema.  Pulses: 2+ peripheral pulses all extremities.	  Skin: Normal skin color. No rashes or lesions. No ecchymoses. No cyanosis. Warm to touch.  Lymph Nodes: Cervical, supraclavicular and axillary nodes normal  Neurological: Motor and sensory examination equal and normal. A and O x 3  Psychiatry: Appropriate mood and affect.      LABS:                          14.1   16.83 )-----------( 170      ( 16 May 2019 09:02 )             42.2     CBC    WBC  16.83 <==, 17.4 <==    Hemoglobin  14.1 <<==, 14.8 <<==    Hematocrit  42.2 <==, 43.9 <==    Platelets  170 <==, 198 <==      135  |  104  |  18  ----------------------------<  111<H>    05-16  3.9   |  22  |  0.99    LYTES    sodium  135 <==, 133 <==    potassium   3.9 <==, 4.3 <==    chloride  104 <==, 99 <==    carbon dioxide  22 <==, 21 <==    =============================================================================================  RENAL FUNCTION:    Creatinine:   0.99  <<==, 1.07  <<==    BUN:   18 <==, 23 <==    ============================================================================================    calcium   9.0 <==, 9.4 <==    phos       mag       ============================================================================================  LFTs    AST:   39 <==     ALT:  39  <==     AP:  57  <=    Bili:  1.7  <=      Venous Blood Gas:  05-15 @ 23:07  7.43/37/43/24/76  VBG Lactate: 2.1    MICROBIOLOGY:     Rapid Respiratory Viral Panel (19 @ 00:12)    Rapid RVP Result: NotDetec: This Respiratory Panel uses polymerase chain reaction (PCR) to detect for  adenovirus; coronavirus (HKU1, NL63, 229E, OC43); human metapneumovirus  (hMPV); human enterovirus/rhinovirus (Entero/RV); influenza A; influenza  A/H1; influenza A/H3; influenza A/H1-2009; influenza B; parainfluenza  viruses 1, 2, 3, 4; respiratory syncytial virus; Mycoplasma pneumoniae;  and Chlamydophila pneumoniae.    RADIOLOGY:  [x ] Chest radiographs reviewed and interpreted by me    EXAM:  XR CHEST PA LAT 2V                          PROCEDURE DATE:  05/15/2019      INTERPRETATION:  CLINICAL INFORMATION: cough    EXAM: PA and lateral chest radiographs    COMPARISON: Chest radiograph from 2008.    FINDINGS:    Status post median sternotomy and aortic valve replacement. Retained   epicardial pacer leads are noted.  Right lower lobe opacity consistent with pneumonia.  The cardiac silhouette is within normal limits.  There is no acute abnormality of the visualized osseous structures.    IMPRESSION:     Right lower lobe pneumonia. Follow-up to resolution.    MARC AREVALO M.D., RADIOLOGY RESIDENT  This document has been electronically signed.  HAYLIE DYSON M.D., ATTENDING RADIOLOGIST  This document has been electronically signed. May 16 2019  8:56AM  ---------------------------------------------------------------------------------------------------------------

## 2019-05-16 NOTE — H&P ADULT - NSHPLABSRESULTS_GEN_ALL_CORE
Labs, imaging and EKG personally reviewed by me.     CBC is notable for WBC of 17.4, with 7% bandemia.   CMP found low Na of 133. Tbili is elevated at 1.7. Lactate is 2.1 WNL.     EKG sinus HR 92, normal axis, QTc 425, TW flattening AVL, V1    LABS:                        14.8   17.4  )-----------( 198      ( 15 May 2019 23:07 )             43.9     Hgb Trend: 14.8<--  05-15    133<L>  |  99  |  23  ----------------------------<  117<H>  4.3   |  21<L>  |  1.07    Ca    9.4      15 May 2019 23:07    TPro  7.6  /  Alb  4.0  /  TBili  1.7<H>  /  DBili  x   /  AST  39  /  ALT  39  /  AlkPhos  57  05-15    Creatinine Trend: 1.07<--    Venous Blood Gas:  05-15 @ 23:07  7.43/37/43/24/76  VBG Lactate: 2.1    < from: Xray Chest 2 Views PA/Lat (05.15.19 @ 23:39) >    ******PRELIMINARY REPORT******        INTERPRETATION:  RLL pna    < end of copied text >

## 2019-05-16 NOTE — PATIENT PROFILE ADULT - BRADEN SENSORY
Problem:  ([place],NICU)  Goal: Signs and Symptoms of Listed Potential Problems Will be Absent, Minimized or Managed ([place])  Outcome: Ongoing (interventions implemented as appropriate)   18   Goal/Outcome Evaluation   Problems Assessed ([place]) all   Problems Present ([place]) situational response       Problem: Respiratory Distress Syndrome ([place],NICU)  Goal: Signs and Symptoms of Listed Potential Problems Will be Absent, Minimized or Managed (Respiratory Distress Syndrome)  Outcome: Outcome(s) achieved Date Met: 18   Goal/Outcome Evaluation   Problems Assessed (Respiratory Distress Syndrome) all   Problems Present (RDS) none       Problem: Patient Care Overview  Goal: Plan of Care Review  Outcome: Ongoing (interventions implemented as appropriate)   18 [id number]   Plan of Care Review   Progress improving --  --    OTHER   Outcome Summary --  --  IVF dc'd; Tolerating feeds this shift; 3 teresa/desat episodes this shift; Continue to monitor   Coping/Psychosocial   Care Plan Reviewed With --  mother --      Goal: Individualization and Mutuality   18   Individualization   Family Specific Preferences --  Breastfeed every 3hrs and supplement with Neosure after each feeding   Patient/Family Specific Goals (Include Timeframe) Maintain O2 sats>90; gain weight; tolerate feedings --    Patient/Family Specific Interventions Monitor O2 sats; daily weight; feed every 3 hours --      Goal: Discharge Needs Assessment  Outcome: Ongoing (interventions implemented as appropriate)   18   Discharge Needs Assessment   Readmission Within the Last 30 Days no previous admission in last 30 days   Concerns to be Addressed no discharge needs identified   Patient/Family Anticipates Transition to home with family   Patient/Family Anticipated Services at Transition none   Transportation Concerns car, none   Anticipated Changes Related  to Illness none   Equipment Needed After Discharge none   Disability   Equipment Currently Used at Home none     Goal: Interprofessional Rounds/Family Conf  Outcome: Ongoing (interventions implemented as appropriate)   06/06/18 [id number]   Interdisciplinary Rounds/Family Conf   Participants family;advanced practice nurse;nursing;patient;physician          (4) no impairment

## 2019-05-16 NOTE — H&P ADULT - NSHPREVIEWOFSYSTEMS_GEN_ALL_CORE
REVIEW OF SYSTEMS  CONSTITUTIONAL: No fever, no chills, no fatigue  EYES: No eye pain, no vision changes  ENMT:  No difficulty hearing, no throat pain  RESPIRATORY: No cough, no wheezing, no sputum production; No shortness of breath  CARDIOVASCULAR: No chest pain, no palpitations, no CEJA, no leg swelling  GASTROINTESTINAL: No abdominal pain, no nausea, no vomiting, no hematemesis, no diarrhea, no constipation, no melena, no hematochezia.  GENITOURINARY: No dysuria, no hematuria  NEUROLOGICAL: No headaches, no loss of strength, no numbness  SKIN: No itching, no rashes, no lesions   MUSCULOSKELETAL: No joint pain, no joint swelling; No muscle pain  HEME/LYMPH: No easy bruising, bleeding REVIEW OF SYSTEMS  CONSTITUTIONAL: + fever, no chills, + fatigue  EYES: No eye pain, no vision changes  ENMT:  No difficulty hearing, no throat pain  RESPIRATORY: + cough, no wheezing, + yellow sputum production; No shortness of breath  CARDIOVASCULAR: No chest pain, no palpitations, no CEJA, no leg swelling  GASTROINTESTINAL: No abdominal pain, no nausea, no vomiting, no diarrhea, no constipation, no melena, no hematochezia.  GENITOURINARY: No dysuria, no hematuria  NEUROLOGICAL: No headaches, no loss of strength, no numbness  SKIN: No itching, no rashes, no lesions   MUSCULOSKELETAL: No joint pain, no joint swelling; + back pain  HEME/LYMPH: No easy bruising, bleeding

## 2019-05-16 NOTE — H&P ADULT - HISTORY OF PRESENT ILLNESS
This patient is a 63yoM with PMH of htn, aortic stenosis s/p bioprosthetic AVR, arthritis, remote hx of GBS (Hardin Hardin variant), ?SLE who presents to the ED with complaint of fever and cough. He had been diagnosed with influenza on 5/8/2019 after reporting his symptoms to his PMD over the phone, and had been prescribed tamiflu. He took tamiflu and extra strength tylenol for his symptoms ,and completed his course on 5/13/2019. Patient reported having improvement in his symptoms of cough. 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 He thereafter developed recurrence of fever and generalized weakness. His Tmax at home was 103F. He has had cough productive of yellow sputum and back pain and pleuritic CP. Pt denies SOB, CP, palpitations, diarrhea, constipation or abdominal pain. Because of recurrence of sxs, pt visited PMD who requested xray and started oral antibiotic.  The patient continue to feel poorly, thus PMD sent the patient to the ED.    Of note, the patient was diagnosed SLE based on a butterfly rash and positive MANUEL, but had not been on treatment for SLE.    In the ED, T 98.1F, HR 90, /73, RR 20, SpO2 96%RA.  Pt was given motrin 600mg PO, tylenol 975mg PO, levofloxacin 750mg IVPB, IVNS 1L.

## 2019-05-16 NOTE — H&P ADULT - NSHPPHYSICALEXAM_GEN_ALL_CORE
Vital Signs Last 24 Hrs  T(C): 39.1 (16 May 2019 00:10), Max: 39.1 (16 May 2019 00:10)  T(F): 102.3 (16 May 2019 00:10), Max: 102.3 (16 May 2019 00:10)  HR: 94 (16 May 2019 00:10) (90 - 94)  BP: 117/73 (16 May 2019 00:10) (117/73 - 121/73)  BP(mean): --  RR: 17 (16 May 2019 00:10) (17 - 20)  SpO2: 94% (16 May 2019 00:10) (94% - 96%)    PHYSICAL EXAM:  GENERAL: NAD, well-groomed, well-developed  HEAD:  Atraumatic, Normocephalic  EYES: EOMI, PERRLA, conjunctiva and sclera clear  ENMT: No oropharyngeal exudates, erythema or lesions,  Moist mucous membranes, good dentition  NECK: Supple, no cervical lymphadenopathy  NERVOUS SYSTEM:  Alert & Oriented X3, CN II-XII intact, 5/5 BUE and BLE motor strength, full sensation to light touch   CHEST/LUNG: Rhonchorous breath sounds over R lung fields, no crackles or wheezing  HEART: Regular rate and rhythm; No murmurs, rubs, or gallops  ABDOMEN: Soft, Nontender, Nondistended  EXTREMITIES:  2+ Peripheral Pulses, No clubbing, cyanosis, or edema  LYMPH: No cervical lymphadenopathy noted  SKIN: No rashes or lesions

## 2019-05-16 NOTE — CONSULT NOTE ADULT - ASSESSMENT
ASSESSMENT:    right lower lobe pneumonia with pleurisy, fever, chills, cough with sputum production and leukocytosis in the setting of a prosthetic aortic valve    PLAN/RECOMMENDATIONS:    stable oxygenation on room air  no pulmonary medications are indicated  vanco/levaquin for now - would discontinue vancomycin if outpatient and inpatient blood cultures do not reveal staph (which seems unlikely) - await urine Legionella antigen results  IV fluids until afebrile and eating well  antipyretics  analgesics  DVT prophylaxis - SQ lovenox  bowel regimen    Thank you for the courtesy of this referral. Plan of care discussed with the patient and his son at bedside.     Jim Zhou MD, [facility]  [phone]  Pulmonary Medicine

## 2019-05-16 NOTE — H&P ADULT - ASSESSMENT
This patient is a 63yoM with PMH of htn, aortic stenosis s/p bioprosthetic AVR, arthritis, remote hx of GBS (Hardin Hardin variant), ?SLE who presents to the ED with complaint of fever and cough after recent treatment for influenza, suspicious for secondary bacterial PNA.

## 2019-05-16 NOTE — H&P ADULT - NSHPSOCIALHISTORY_GEN_ALL_CORE
The patient lives with his wife.  He is a physical therapist.   He denies tobacco or drug use. He has about 4-5 servings of wine weekly.

## 2019-05-16 NOTE — H&P ADULT - PROBLEM SELECTOR PLAN 3
VTE ppx: IMPROVE score 2 based on age and decreased mobility, start lovenox subQ  Activity: ambulate with assistance  Diet: DASH

## 2019-05-17 LAB
BASOPHILS # BLD AUTO: 0.05 K/UL — SIGNIFICANT CHANGE UP (ref 0–0.2)
BASOPHILS NFR BLD AUTO: 0.3 % — SIGNIFICANT CHANGE UP (ref 0–2)
EOSINOPHIL # BLD AUTO: 0.03 K/UL — SIGNIFICANT CHANGE UP (ref 0–0.5)
EOSINOPHIL NFR BLD AUTO: 0.2 % — SIGNIFICANT CHANGE UP (ref 0–6)
GRAM STN FLD: SIGNIFICANT CHANGE UP
HCT VFR BLD CALC: 41.5 % — SIGNIFICANT CHANGE UP (ref 39–50)
HCV AB S/CO SERPL IA: 0.08 S/CO — SIGNIFICANT CHANGE UP (ref 0–0.99)
HCV AB SERPL-IMP: SIGNIFICANT CHANGE UP
HGB BLD-MCNC: 13.7 G/DL — SIGNIFICANT CHANGE UP (ref 13–17)
IMM GRANULOCYTES NFR BLD AUTO: 0.7 % — SIGNIFICANT CHANGE UP (ref 0–1.5)
LYMPHOCYTES # BLD AUTO: 12.3 % — LOW (ref 13–44)
LYMPHOCYTES # BLD AUTO: 2.05 K/UL — SIGNIFICANT CHANGE UP (ref 1–3.3)
MCHC RBC-ENTMCNC: 30.9 PG — SIGNIFICANT CHANGE UP (ref 27–34)
MCHC RBC-ENTMCNC: 33 GM/DL — SIGNIFICANT CHANGE UP (ref 32–36)
MCV RBC AUTO: 93.5 FL — SIGNIFICANT CHANGE UP (ref 80–100)
MONOCYTES # BLD AUTO: 1.05 K/UL — HIGH (ref 0–0.9)
MONOCYTES NFR BLD AUTO: 6.3 % — SIGNIFICANT CHANGE UP (ref 2–14)
NEUTROPHILS # BLD AUTO: 13.36 K/UL — HIGH (ref 1.8–7.4)
NEUTROPHILS NFR BLD AUTO: 80.2 % — HIGH (ref 43–77)
PLATELET # BLD AUTO: 216 K/UL — SIGNIFICANT CHANGE UP (ref 150–400)
RBC # BLD: 4.44 M/UL — SIGNIFICANT CHANGE UP (ref 4.2–5.8)
RBC # FLD: 13.5 % — SIGNIFICANT CHANGE UP (ref 10.3–14.5)
SPECIMEN SOURCE: SIGNIFICANT CHANGE UP
VANCOMYCIN TROUGH SERPL-MCNC: 4.1 UG/ML — LOW (ref 10–20)
WBC # BLD: 16.66 K/UL — HIGH (ref 3.8–10.5)
WBC # FLD AUTO: 16.66 K/UL — HIGH (ref 3.8–10.5)

## 2019-05-17 RX ORDER — OXYCODONE HYDROCHLORIDE 5 MG/1
2.5 TABLET ORAL ONCE
Refills: 0 | Status: DISCONTINUED | OUTPATIENT
Start: 2019-05-17 | End: 2019-05-17

## 2019-05-17 RX ADMIN — Medication 650 MILLIGRAM(S): at 15:25

## 2019-05-17 RX ADMIN — Medication 650 MILLIGRAM(S): at 08:50

## 2019-05-17 RX ADMIN — ENOXAPARIN SODIUM 40 MILLIGRAM(S): 100 INJECTION SUBCUTANEOUS at 19:49

## 2019-05-17 RX ADMIN — Medication 166.67 MILLIGRAM(S): at 05:21

## 2019-05-17 RX ADMIN — Medication 650 MILLIGRAM(S): at 07:50

## 2019-05-17 RX ADMIN — Medication 100 MILLIGRAM(S): at 22:15

## 2019-05-17 RX ADMIN — Medication 100 MILLIGRAM(S): at 05:21

## 2019-05-17 RX ADMIN — Medication 100 MILLIGRAM(S): at 12:56

## 2019-05-17 RX ADMIN — OXYCODONE HYDROCHLORIDE 2.5 MILLIGRAM(S): 5 TABLET ORAL at 23:37

## 2019-05-17 RX ADMIN — Medication 650 MILLIGRAM(S): at 22:14

## 2019-05-17 RX ADMIN — Medication 81 MILLIGRAM(S): at 12:55

## 2019-05-17 RX ADMIN — Medication 650 MILLIGRAM(S): at 02:00

## 2019-05-17 RX ADMIN — Medication 166.67 MILLIGRAM(S): at 19:48

## 2019-05-17 RX ADMIN — Medication 650 MILLIGRAM(S): at 14:55

## 2019-05-17 RX ADMIN — Medication 650 MILLIGRAM(S): at 01:18

## 2019-05-17 RX ADMIN — SENNA PLUS 2 TABLET(S): 8.6 TABLET ORAL at 22:15

## 2019-05-18 ENCOUNTER — TRANSCRIPTION ENCOUNTER (OUTPATIENT)
Age: 64
End: 2019-05-18

## 2019-05-18 LAB
CULTURE RESULTS: SIGNIFICANT CHANGE UP
HCT VFR BLD CALC: 41.6 % — SIGNIFICANT CHANGE UP (ref 39–50)
HGB BLD-MCNC: 14 G/DL — SIGNIFICANT CHANGE UP (ref 13–17)
MCHC RBC-ENTMCNC: 31.6 PG — SIGNIFICANT CHANGE UP (ref 27–34)
MCHC RBC-ENTMCNC: 33.6 GM/DL — SIGNIFICANT CHANGE UP (ref 32–36)
MCV RBC AUTO: 94.1 FL — SIGNIFICANT CHANGE UP (ref 80–100)
PLATELET # BLD AUTO: 284 K/UL — SIGNIFICANT CHANGE UP (ref 150–400)
RBC # BLD: 4.42 M/UL — SIGNIFICANT CHANGE UP (ref 4.2–5.8)
RBC # FLD: 12 % — SIGNIFICANT CHANGE UP (ref 10.3–14.5)
SPECIMEN SOURCE: SIGNIFICANT CHANGE UP
WBC # BLD: 12.2 K/UL — HIGH (ref 3.8–10.5)
WBC # FLD AUTO: 12.2 K/UL — HIGH (ref 3.8–10.5)

## 2019-05-18 PROCEDURE — 71250 CT THORAX DX C-: CPT | Mod: 26

## 2019-05-18 RX ORDER — VANCOMYCIN HCL 1 G
1250 VIAL (EA) INTRAVENOUS EVERY 8 HOURS
Refills: 0 | Status: DISCONTINUED | OUTPATIENT
Start: 2019-05-18 | End: 2019-05-20

## 2019-05-18 RX ADMIN — Medication 650 MILLIGRAM(S): at 06:11

## 2019-05-18 RX ADMIN — SENNA PLUS 2 TABLET(S): 8.6 TABLET ORAL at 21:53

## 2019-05-18 RX ADMIN — Medication 100 MILLIGRAM(S): at 12:13

## 2019-05-18 RX ADMIN — Medication 100 MILLIGRAM(S): at 06:10

## 2019-05-18 RX ADMIN — Medication 650 MILLIGRAM(S): at 21:53

## 2019-05-18 RX ADMIN — Medication 650 MILLIGRAM(S): at 00:00

## 2019-05-18 RX ADMIN — Medication 166.67 MILLIGRAM(S): at 21:53

## 2019-05-18 RX ADMIN — Medication 166.67 MILLIGRAM(S): at 06:10

## 2019-05-18 RX ADMIN — Medication 166.67 MILLIGRAM(S): at 13:57

## 2019-05-18 RX ADMIN — OXYCODONE HYDROCHLORIDE 2.5 MILLIGRAM(S): 5 TABLET ORAL at 00:00

## 2019-05-18 RX ADMIN — Medication 100 MILLIGRAM(S): at 21:54

## 2019-05-18 RX ADMIN — Medication 81 MILLIGRAM(S): at 12:13

## 2019-05-18 RX ADMIN — Medication 650 MILLIGRAM(S): at 22:23

## 2019-05-18 RX ADMIN — ENOXAPARIN SODIUM 40 MILLIGRAM(S): 100 INJECTION SUBCUTANEOUS at 15:10

## 2019-05-18 RX ADMIN — Medication 650 MILLIGRAM(S): at 15:10

## 2019-05-18 NOTE — CHART NOTE - NSCHARTNOTEFT_GEN_A_CORE
D/w pharmacy #[id number] vanco trough at 4.1 pre 4th dose at vancomycin 1250mg q12, instructed by pharmacy to change to 1250mg q8hr.     Ronaldo Eldridge PA-C  Department of Medicine

## 2019-05-19 LAB
ANION GAP SERPL CALC-SCNC: 10 MMOL/L — SIGNIFICANT CHANGE UP (ref 5–17)
BUN SERPL-MCNC: 13 MG/DL — SIGNIFICANT CHANGE UP (ref 7–23)
CALCIUM SERPL-MCNC: 9.2 MG/DL — SIGNIFICANT CHANGE UP (ref 8.4–10.5)
CHLORIDE SERPL-SCNC: 106 MMOL/L — SIGNIFICANT CHANGE UP (ref 96–108)
CO2 SERPL-SCNC: 22 MMOL/L — SIGNIFICANT CHANGE UP (ref 22–31)
CREAT SERPL-MCNC: 0.92 MG/DL — SIGNIFICANT CHANGE UP (ref 0.5–1.3)
GLUCOSE SERPL-MCNC: 114 MG/DL — HIGH (ref 70–99)
HCT VFR BLD CALC: 42.2 % — SIGNIFICANT CHANGE UP (ref 39–50)
HGB BLD-MCNC: 14.1 G/DL — SIGNIFICANT CHANGE UP (ref 13–17)
MCHC RBC-ENTMCNC: 31.5 PG — SIGNIFICANT CHANGE UP (ref 27–34)
MCHC RBC-ENTMCNC: 33.4 GM/DL — SIGNIFICANT CHANGE UP (ref 32–36)
MCV RBC AUTO: 94.2 FL — SIGNIFICANT CHANGE UP (ref 80–100)
PLATELET # BLD AUTO: 302 K/UL — SIGNIFICANT CHANGE UP (ref 150–400)
POTASSIUM SERPL-MCNC: 3.9 MMOL/L — SIGNIFICANT CHANGE UP (ref 3.5–5.3)
POTASSIUM SERPL-SCNC: 3.9 MMOL/L — SIGNIFICANT CHANGE UP (ref 3.5–5.3)
RBC # BLD: 4.48 M/UL — SIGNIFICANT CHANGE UP (ref 4.2–5.8)
RBC # FLD: 13.3 % — SIGNIFICANT CHANGE UP (ref 10.3–14.5)
SODIUM SERPL-SCNC: 138 MMOL/L — SIGNIFICANT CHANGE UP (ref 135–145)
VANCOMYCIN TROUGH SERPL-MCNC: 15.2 UG/ML — SIGNIFICANT CHANGE UP (ref 10–20)
WBC # BLD: 10.16 K/UL — SIGNIFICANT CHANGE UP (ref 3.8–10.5)
WBC # FLD AUTO: 10.16 K/UL — SIGNIFICANT CHANGE UP (ref 3.8–10.5)

## 2019-05-19 RX ORDER — OXYCODONE HYDROCHLORIDE 5 MG/1
2.5 TABLET ORAL ONCE
Refills: 0 | Status: DISCONTINUED | OUTPATIENT
Start: 2019-05-19 | End: 2019-05-19

## 2019-05-19 RX ADMIN — OXYCODONE HYDROCHLORIDE 2.5 MILLIGRAM(S): 5 TABLET ORAL at 21:15

## 2019-05-19 RX ADMIN — OXYCODONE HYDROCHLORIDE 2.5 MILLIGRAM(S): 5 TABLET ORAL at 00:39

## 2019-05-19 RX ADMIN — Medication 100 MILLIGRAM(S): at 21:15

## 2019-05-19 RX ADMIN — SENNA PLUS 2 TABLET(S): 8.6 TABLET ORAL at 21:15

## 2019-05-19 RX ADMIN — OXYCODONE HYDROCHLORIDE 2.5 MILLIGRAM(S): 5 TABLET ORAL at 21:45

## 2019-05-19 RX ADMIN — Medication 650 MILLIGRAM(S): at 20:08

## 2019-05-19 RX ADMIN — Medication 166.67 MILLIGRAM(S): at 14:59

## 2019-05-19 RX ADMIN — Medication 650 MILLIGRAM(S): at 09:50

## 2019-05-19 RX ADMIN — Medication 100 MILLIGRAM(S): at 06:17

## 2019-05-19 RX ADMIN — ENOXAPARIN SODIUM 40 MILLIGRAM(S): 100 INJECTION SUBCUTANEOUS at 17:31

## 2019-05-19 RX ADMIN — Medication 650 MILLIGRAM(S): at 20:38

## 2019-05-19 RX ADMIN — Medication 166.67 MILLIGRAM(S): at 06:54

## 2019-05-19 RX ADMIN — OXYCODONE HYDROCHLORIDE 2.5 MILLIGRAM(S): 5 TABLET ORAL at 01:09

## 2019-05-19 RX ADMIN — Medication 100 MILLIGRAM(S): at 14:58

## 2019-05-19 RX ADMIN — Medication 166.67 MILLIGRAM(S): at 21:15

## 2019-05-19 RX ADMIN — Medication 81 MILLIGRAM(S): at 14:58

## 2019-05-19 RX ADMIN — Medication 650 MILLIGRAM(S): at 09:04

## 2019-05-19 NOTE — CHART NOTE - NSCHARTNOTEFT_GEN_A_CORE
Patient is a 63y old  Male who presents with a chief complaint of fever and cough (18 May 2019 08:39)  Pt seen for abdominal pain, relieved with Oxycodone IR 2.5 mg PO given last night  This AM, pt noted lying comfortably in bed, in NAD  He relates having pain on the right side at the lower border of the rib cage and upper abdomen, since Wed.   He states he's able to ambulate without discomfort, but when lying down at night does feel the pain  Pt admitted with RLL PNA with pleurisy.   He currently has no cp, sob, dizziness, n/v      Vital Signs Last 24 Hrs  T(C): 37.4 (19 May 2019 05:45), Max: 37.7 (18 May 2019 21:03)  T(F): 99.4 (19 May 2019 05:45), Max: 99.9 (18 May 2019 21:03)  HR: 78 (19 May 2019 05:45) (69 - 82)  BP: 125/70 (19 May 2019 05:45) (114/71 - 132/73)  BP(mean): --  RR: 18 (19 May 2019 05:45) (18 - 18)  SpO2: 99% (19 May 2019 05:45) (95% - 99%)      Labs:                          14.0   12.2  )-----------( 284      ( 18 May 2019 12:45 )             41.6     05-19    138  |  106  |  13  ----------------------------<  114<H>  3.9   |  22  |  0.92    Ca    9.2      19 May 2019 05:43        Radiology:    Physical Exam:  General: WN/WD NAD  Neurology: A&Ox3, nonfocal, CARRENO x 4  Head:  Normocephalic, atraumatic  Respiratory: CTA B/L  CV: RRR, S1S2, no murmur  Abdominal: Soft, + BS, non distended, + pain on palpation RUQ below lower border of rib cage      Assessment & Plan:  HPI:  This patient is a 63yoM with PMH of htn, aortic stenosis s/p bioprosthetic AVR, arthritis, remote hx of GBS (Hardin Hardin variant), ?SLE who presents to the ED with complaint of fever and cough. He had been diagnosed with influenza on 5/8/2019 after reporting his symptoms to his PMD over the phone, and had been prescribed tamiflu. He took tamiflu and extra strength tylenol for his symptoms ,and completed his course on 5/13/2019. Patient reported having improvement in his symptoms of cough. 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 He thereafter developed recurrence of fever and generalized weakness. His Tmax at home was 103F. He has had cough productive of yellow sputum and back pain and pleuritic CP. Pt denies SOB, CP, palpitations, diarrhea, constipation or abdominal pain. Because of recurrence of sxs, pt visited PMD who requested xray and started oral antibiotic.  The patient continue to feel poorly, thus PMD sent the patient to the ED.  Of note, the patient was diagnosed SLE based on a butterfly rash and positive MANUEL, but had not been on treatment for SLE.  In the ED, T 98.1F, HR 90, /73, RR 20, SpO2 96%RA.  Pt was given motrin 600mg PO, tylenol 975mg PO, levofloxacin 750mg IVPB, IVNS 1L. (16 May 2019 03:20)    Pt seen for "abdominal pain", per RN  Pt has pain on palpation RUQ in vicinity of lower border of rib cage  Pt reported relief after taking Oxycodone IR  Unclear if pain is related to plurisy vs. abdominal     PLAN:  >Continue to monitor  >Continue Vancomycin & Levaquin  >Pain meds PRN  >Endorsed to day team; follow up with attending

## 2019-05-20 ENCOUNTER — TRANSCRIPTION ENCOUNTER (OUTPATIENT)
Age: 64
End: 2019-05-20

## 2019-05-20 VITALS
SYSTOLIC BLOOD PRESSURE: 147 MMHG | TEMPERATURE: 99 F | HEART RATE: 80 BPM | OXYGEN SATURATION: 96 % | RESPIRATION RATE: 18 BRPM | DIASTOLIC BLOOD PRESSURE: 75 MMHG

## 2019-05-20 LAB — VANCOMYCIN TROUGH SERPL-MCNC: 19.1 UG/ML — SIGNIFICANT CHANGE UP (ref 10–20)

## 2019-05-20 PROCEDURE — 82803 BLOOD GASES ANY COMBINATION: CPT

## 2019-05-20 PROCEDURE — 96365 THER/PROPH/DIAG IV INF INIT: CPT

## 2019-05-20 PROCEDURE — 86803 HEPATITIS C AB TEST: CPT

## 2019-05-20 PROCEDURE — 87040 BLOOD CULTURE FOR BACTERIA: CPT

## 2019-05-20 PROCEDURE — 82435 ASSAY OF BLOOD CHLORIDE: CPT

## 2019-05-20 PROCEDURE — 83605 ASSAY OF LACTIC ACID: CPT

## 2019-05-20 PROCEDURE — 87449 NOS EACH ORGANISM AG IA: CPT

## 2019-05-20 PROCEDURE — 93005 ELECTROCARDIOGRAM TRACING: CPT

## 2019-05-20 PROCEDURE — 87633 RESP VIRUS 12-25 TARGETS: CPT

## 2019-05-20 PROCEDURE — 84295 ASSAY OF SERUM SODIUM: CPT

## 2019-05-20 PROCEDURE — 99285 EMERGENCY DEPT VISIT HI MDM: CPT | Mod: 25

## 2019-05-20 PROCEDURE — 71046 X-RAY EXAM CHEST 2 VIEWS: CPT

## 2019-05-20 PROCEDURE — 87070 CULTURE OTHR SPECIMN AEROBIC: CPT

## 2019-05-20 PROCEDURE — 80202 ASSAY OF VANCOMYCIN: CPT

## 2019-05-20 PROCEDURE — 82947 ASSAY GLUCOSE BLOOD QUANT: CPT

## 2019-05-20 PROCEDURE — 85014 HEMATOCRIT: CPT

## 2019-05-20 PROCEDURE — 80048 BASIC METABOLIC PNL TOTAL CA: CPT

## 2019-05-20 PROCEDURE — 80053 COMPREHEN METABOLIC PANEL: CPT

## 2019-05-20 PROCEDURE — 71250 CT THORAX DX C-: CPT

## 2019-05-20 PROCEDURE — 84132 ASSAY OF SERUM POTASSIUM: CPT

## 2019-05-20 PROCEDURE — 87798 DETECT AGENT NOS DNA AMP: CPT

## 2019-05-20 PROCEDURE — 82330 ASSAY OF CALCIUM: CPT

## 2019-05-20 PROCEDURE — 87581 M.PNEUMON DNA AMP PROBE: CPT

## 2019-05-20 PROCEDURE — 85027 COMPLETE CBC AUTOMATED: CPT

## 2019-05-20 PROCEDURE — 87486 CHLMYD PNEUM DNA AMP PROBE: CPT

## 2019-05-20 RX ORDER — DOCUSATE SODIUM 100 MG
1 CAPSULE ORAL
Qty: 60 | Refills: 0
Start: 2019-05-20 | End: 2019-06-18

## 2019-05-20 RX ORDER — ACETAMINOPHEN 500 MG
2 TABLET ORAL
Qty: 0 | Refills: 0 | DISCHARGE
Start: 2019-05-20

## 2019-05-20 RX ORDER — ASPIRIN/CALCIUM CARB/MAGNESIUM 324 MG
1 TABLET ORAL
Qty: 0 | Refills: 0 | DISCHARGE
Start: 2019-05-20

## 2019-05-20 RX ORDER — OXYCODONE AND ACETAMINOPHEN 5; 325 MG/1; MG/1
1 TABLET ORAL EVERY 4 HOURS
Refills: 0 | Status: DISCONTINUED | OUTPATIENT
Start: 2019-05-20 | End: 2019-05-20

## 2019-05-20 RX ORDER — IBUPROFEN 200 MG
1 TABLET ORAL
Qty: 0 | Refills: 0 | DISCHARGE

## 2019-05-20 RX ORDER — ASPIRIN/CALCIUM CARB/MAGNESIUM 324 MG
1 TABLET ORAL
Qty: 0 | Refills: 0 | DISCHARGE

## 2019-05-20 RX ORDER — CIPROFLOXACIN LACTATE 400MG/40ML
1 VIAL (ML) INTRAVENOUS
Qty: 7 | Refills: 0
Start: 2019-05-20 | End: 2019-05-26

## 2019-05-20 RX ORDER — SENNA PLUS 8.6 MG/1
2 TABLET ORAL
Qty: 60 | Refills: 0
Start: 2019-05-20 | End: 2019-06-18

## 2019-05-20 RX ADMIN — Medication 650 MILLIGRAM(S): at 07:48

## 2019-05-20 RX ADMIN — Medication 166.67 MILLIGRAM(S): at 06:28

## 2019-05-20 RX ADMIN — Medication 81 MILLIGRAM(S): at 13:54

## 2019-05-20 RX ADMIN — Medication 100 MILLIGRAM(S): at 06:28

## 2019-05-20 RX ADMIN — Medication 100 MILLIGRAM(S): at 13:55

## 2019-05-20 NOTE — DISCHARGE NOTE PROVIDER - PROVIDER TOKENS
PROVIDER:[TOKEN:[[id number]]],PROVIDER:[TOKEN:[[id number]],FOLLOWUP:[1 week]],PROVIDER:[TOKEN:[[id number]],FOLLOWUP:[1 week]]

## 2019-05-20 NOTE — PROVIDER CONTACT NOTE (OTHER) - ASSESSMENT
pt A&OX4 no signs of distress noted. pt resting in bed. denies sob.
pt is A&Ox4 denies pain & shortness of breath at this time. no signs of distress noted. pt resting in bed. pt voiding. iv site WDL.
pt VSS, pt a and ox4, pt ambulatory , pt voids, denies n/v/d, denies chest pain, denies headache
pt a a nd ox4, vss , voids, denies n/v/d, denies chest pain, denies headache

## 2019-05-20 NOTE — DISCHARGE NOTE PROVIDER - NSDCCPCAREPLAN_GEN_ALL_CORE_FT
PRINCIPAL DISCHARGE DIAGNOSIS  Diagnosis: Pneumonia  Assessment and Plan of Treatment: Pneumonia is a lung infection that can cause a fever, cough, and trouble breathing.  Continue all antibiotics as ordered until complete.  Nutrition is important, eat small frequent meals.  Get lots of rest and drink fluids.  Call your health care provider upon arrival home from hospital and make a follow up appointment for one week.  If your cough worsens, you develop fever greater than 101', you have shaking chills, a fast heartbeat, trouble breathing and/or feel your are breathing much faster than usual, call your healthcare provider.  Make sure you wash your hands frequently.        SECONDARY DISCHARGE DIAGNOSES  Diagnosis: H/O aortic valve replacement with tissue graft  Assessment and Plan of Treatment: Follow up with your Cardiologist in 1 week.

## 2019-05-20 NOTE — DISCHARGE NOTE PROVIDER - CARE PROVIDER_API CALL
Monalisa Kidd)  Critical Care Medicine; Internal Medicine; Pulmonary Disease  [address], [address]  [place] [address]  Phone: [phone]  Fax: [phone]  Follow Up Time:     Sanjeev Alfaro)  Internal Medicine  [address] [place] [address]  Phone: [phone]  Fax: [phone]  Follow Up Time: 1 week    Stefan De Leon)  Cardiovascular Disease; Internal Medicine; Nuclear Cardiology  [address], [address]  [place], [place] [id number]  Phone: [phone]  Fax: [phone]  Follow Up Time: 1 week

## 2019-05-20 NOTE — PROGRESS NOTE ADULT - SUBJECTIVE AND OBJECTIVE BOX
Male  Patient is a 63y old  Male who presents with a chief complaint of fever and cough (16 May 2019 03:20)      HPI:  This patient is a 63yoM with PMH of htn, aortic stenosis s/p bioprosthetic AVR, arthritis, remote hx of GBS (Hardin Hardin variant), ?SLE who presents to the ED with complaint of fever and cough. He had been diagnosed with influenza on 5/8/2019 after reporting his symptoms to his PMD over the phone, and had been prescribed tamiflu. He took tamiflu and extra strength tylenol for his symptoms ,and completed his course on 5/13/2019. Patient reported having improvement in his symptoms of cough. 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 He thereafter developed recurrence of fever and generalized weakness. His Tmax at home was 103F. He has had cough productive of yellow sputum and back pain and pleuritic CP. Pt denies SOB, CP, palpitations, diarrhea, constipation or abdominal pain. Because of recurrence of sxs, pt visited PMD who requested xray and started oral antibiotic.  The patient continue to feel poorly, thus PMD sent the patient to the ED.    Of note, the patient was diagnosed SLE based on a butterfly rash and positive MANUEL, but had not been on treatment for SLE.    In the ED, T 98.1F, HR 90, /73, RR 20, SpO2 96%RA.  Pt was given motrin 600mg PO, tylenol 975mg PO, levofloxacin 750mg IVPB, IVNS 1L. (16 May 2019 03:20)      MEDICINE ATTENDING  As above.  One week fever severe muscle aches treated with Tamiflu empirically.  Improved almost to resolution with development of  Chills Fever, productive cough with mucopurulent sputum.  Tachypneic and tachycardic with weakness and fatigue.  referred to ER  RVP negative  RLL infiltrate on CXR. Leukocytosis.  Blood cultures obtained in office due to AVR and prolonged fevers.    Vital Signs Last 24 Hrs  T(C): 37.4 (16 May 2019 02:51), Max: 39.1 (16 May 2019 00:10)  T(F): 99.4 (16 May 2019 02:51), Max: 102.3 (16 May 2019 00:10)  HR: 82 (16 May 2019 02:51) (82 - 94)  BP: 126/76 (16 May 2019 02:51) (117/73 - 126/76)  BP(mean): --  RR: 17 (16 May 2019 02:51) (17 - 20)  SpO2: 94% (16 May 2019 02:51) (94% - 96%)  Daily Height in cm: 182.88 (15 May 2019 21:08)    Daily       PHYSICAL EXAM:      Constitutional: no chills.  looks fatigued, non toxic    Eyes: Anicteric    Neck:  No nodes, no increased JVP    Respiratory:  RLL crackles/ ronchi    Cardiovascular: RRR nl S1S2, soft systolic M.    Gastrointestinal: Soft, NT, no mass    Extremities: No c/c/e    Neurological: No global or focal deficits    Skin:  Pale, warm, dry    Lymph Nodes: None palpable                                14.8   17.4  )-----------( 198      ( 15 May 2019 23:07 )             43.9     05-16    135  |  104  |  18  ----------------------------<  111<H>  3.9   |  22  |  0.99    Ca    9.0      16 May 2019 05:55    TPro  7.6  /  Alb  4.0  /  TBili  1.7<H>  /  DBili  x   /  AST  39  /  ALT  39  /  AlkPhos  57  05-15          MEDICATIONS  (STANDING):  aspirin enteric coated 81 milliGRAM(s) Oral daily  enoxaparin Injectable 40 milliGRAM(s) SubCutaneous every 24 hours  levoFLOXacin IVPB 750 milliGRAM(s) IV Intermittent every 24 hours  sodium chloride 0.9%. 1000 milliLiter(s) (100 mL/Hr) IV Continuous <Continuous>  vancomycin  IVPB 1250 milliGRAM(s) IV Intermittent every 12 hours    MEDICATIONS  (PRN):    < from: Xray Chest 2 Views PA/Lat (05.15.19 @ 23:39) >  ******PRELIMINARY REPORT******    ******PRELIMINARY REPORT******          EXAM:  XR CHEST PA LAT 2V                            PROCEDURE DATE:  05/15/2019      ******PRELIMINARY REPORT******    ******PRELIMINARY REPORT******              INTERPRETATION:  RLL pna              ******PRELIMINARY REPORT******    ******PRELIMINARY REPORT******          < end of copied text Rapid RVP Result: NotDete: This Respiratory Panel uses polymerase chain reaction (PCR) to detect for  adenovirus; coronavirus (HKU1, NL63, 229E, OC43); human metapneumovirus  (hMPV); human enterovirus/rhinovirus (Entero/RV); influenza A; influenza  A/H1; influenza A/H3; influenza A/H1-2009; influenza B; parainfluenza  viruses 1, 2, 3, 4; respiratory syncytial virus; Mycoplasma pneumoniae;  and Chlamydophila pneumoniae. (05.16.19 @ 00:12)          Impression    Community acquired pneumonia.  Stable oxygenation. No respiratory distress or acid-base disorder.  Leukocytosis  h/o AVR ( 2 years)    Plan    Continue IV Levaquin and vancomycin  O2 as needed to keep sats > 92%  VTE prophylaxis- OOB  f/u blood and sputum cultures  f/u Legionella antigen testing  IVF and encourage po intake.    Sanjeev Alfaro MD  [phone]
[facility] - [facility] - PROGRESS NOTE    CHIEF COMPLAINT: pneumonia; pleurisy; fever/chills    INTERVAL HISTORY: clinically much improved - decreased fatigue, malaise and weakness - less right sided pleuritic chest pain - less cough productive of scant sputum - no fever, chills or sweats - better appetite; blood cultures are negative; no chest congestion or wheeze; no anterior chest pain/pressure or palpitations;    REVIEW OF SYSTEMS:  Constitutional: As per interval history  HEENT: Within normal limits  CV: As per interval history  Resp: As per interval history  GI: Within normal limits   : Within normal limits  Musculoskeletal:  right sided chest pain  Skin: Within normal limits  Neurological: Within normal limits  Psychiatric: Within normal limits  Endocrine: Within normal limits  Hematologic/Lymphatic: Within normal limits  Allergic/Immunologic: Within normal limits    MEDICATIONS:     Pulmonary "    Anti-microbials:  levoFLOXacin IVPB 750 milliGRAM(s) IV Intermittent every 24 hours  vancomycin  IVPB 1250 milliGRAM(s) IV Intermittent every 8 hours    Cardiovascular:    Other:  aspirin enteric coated 81 milliGRAM(s) Oral daily  docusate sodium 100 milliGRAM(s) Oral three times a day  enoxaparin Injectable 40 milliGRAM(s) SubCutaneous every 24 hours  senna 2 Tablet(s) Oral at bedtime  sodium chloride 0.9%. 1000 milliLiter(s) IV Continuous <Continuous>    MEDICATIONS  (PRN):  acetaminophen   Tablet .. 650 milliGRAM(s) Oral every 6 hours PRN Moderate Pain (4 - 6)  polyethylene glycol 3350 17 Gram(s) Oral daily PRN Constipation        OBJECTIVE:    I&O's Detail    17 May 2019 07:01  -  18 May 2019 07:00  --------------------------------------------------------  IN:    IV PiggyBack: 400 mL    Oral Fluid: 1680 mL  Total IN: 2080 mL    OUT:  Total OUT: 0 mL    Total NET: 2080 mL    PHYSICAL EXAM:       ICU Vital Signs Last 24 Hrs  T(C): 36.9 (18 May 2019 09:53), Max: 37.5 (17 May 2019 21:05)  T(F): 98.5 (18 May 2019 09:53), Max: 99.5 (17 May 2019 21:05)  HR: 78 (18 May 2019 09:53) (78 - 86)  BP: 121/72 (18 May 2019 09:53) (118/65 - 126/71)  BP(mean): --  ABP: --  ABP(mean): --  RR: 18 (18 May 2019 09:53) (18 - 18)  SpO2: 95% (18 May 2019 09:53) (95% - 97%) on room air     General: Awake. Alert. Cooperative. No distress. Appears stated age. 	  HEENT: Atraumatic. Normocephalic. Anicteric. Normal oral mucosa. PERRL. EOMI.  Neck: Supple. Trachea midline. Thyroid without enlargement/tenderness/nodules. No carotid bruit. No JVD.	  Cardiovascular: Regular rate and rhythm. S1 S2 normal. II/VI systolic murmur  Respiratory: Respirations unlabored. Bibasilar rales right > left. Minimal pain with inspiration. No curvature.  Abdomen: Soft. Non-tender. Non-distended. No organomegaly. No masses. Normal bowel sounds.  Extremities: Warm to touch. No clubbing or cyanosis. No pedal edema.  Pulses: 2+ peripheral pulses all extremities.	  Skin: Normal skin color. No rashes or lesions. No ecchymoses. No cyanosis. Warm to touch.  Lymph Nodes: Cervical, supraclavicular and axillary nodes normal  Neurological: Motor and sensory examination equal and normal. A and O x 3  Psychiatry: Appropriate mood and affect.    LABS:                          13.7   16.66 )-----------( 216      ( 17 May 2019 09:17 )             41.5     CBC    WBC  16.66 <==, 16.83 <==, 17.4 <==    Hemoglobin  13.7 <<==, 14.1 <<==, 14.8 <<==    Hematocrit  41.5 <==, 42.2 <==, 43.9 <==    Platelets  216 <==, 170 <==, 198 <==      135  |  104  |  18  ----------------------------<  111<H>    05-16  3.9   |  22  |  0.99      LYTES    sodium  135 <==, 133 <==    potassium   3.9 <==, 4.3 <==    chloride  104 <==, 99 <==    carbon dioxide  22 <==, 21 <==    =============================================================================================  RENAL FUNCTION:    Creatinine:   0.99  <<==, 1.07  <<==    BUN:   18 <==, 23 <==    ============================================================================================    calcium   9.0 <==, 9.4 <==    ============================================================================================  LFTs    AST:   39 <==     ALT:  39  <==     AP:  57  <=    Bili:  1.7  <=    Venous Blood Gas:  05-15 @ 23:07  7.43/37/43/24/76  VBG Lactate: 2.1    MICROBIOLOGY:     Legionella pneumophila Antigen, Urine (05.16.19 @ 09:08)    Legionella Antigen, Urine: Negative    Rapid Respiratory Viral Panel (05.16.19 @ 00:12)    Rapid RVP Result: NotDetec: This Respiratory Panel uses polymerase chain reaction (PCR) to detect for  adenovirus; coronavirus (HKU1, NL63, 229E, OC43); human metapneumovirus  (hMPV); human enterovirus/rhinovirus (Entero/RV); influenza A; influenza  A/H1; influenza A/H3; influenza A/H1-2009; influenza B; parainfluenza  viruses 1, 2, 3, 4; respiratory syncytial virus; Mycoplasma pneumoniae;  and Chlamydophila pneumoniae.    Culture - Sputum . (05.17.19 @ 00:49)    Gram Stain:   Numerous polymorphonuclear leukocytes per low power field  No Squamous epithelial cells per low power field  Numerous Gram Negative Coccobacilli per oil power field    Specimen Source: .Sputum    Culture Results:   Normal Respiratory Elizabeth present    Culture - Blood (05.16.19 @ 10:13)    Specimen Source: .Blood    Culture Results:   No growth to date.    Culture - Blood (05.16.19 @ 10:13)    Specimen Source: .Blood    Culture Results:   No growth to date.    Culture - Blood (05.16.19 @ 00:53)    Specimen Source: .Blood    Culture Results:   No growth to date.    RADIOLOGY:  [x] Chest radiographs reviewed and interpreted by me    EXAM:  XR CHEST PA LAT 2V                          PROCEDURE DATE:  05/15/2019      INTERPRETATION:  CLINICAL INFORMATION: cough    EXAM: PA and lateral chest radiographs    COMPARISON: Chest radiograph from 1/31/2008.    FINDINGS:    Status post median sternotomy and aortic valve replacement. Retained   epicardial pacer leads are noted.  Right lower lobe opacity consistent with pneumonia.  The cardiac silhouette is within normal limits.  There is no acute abnormality of the visualized osseous structures.    IMPRESSION:     Right lower lobe pneumonia. Follow-up to resolution.    MARC AREVALO M.D., RADIOLOGY RESIDENT  This document has been electronically signed.  HAYLIE DYSON M.D., ATTENDING RADIOLOGIST  This document has been electronically signed. May 16 2019  8:56AM  ---------------------------------------------------------------------------------------------------------------
Cough, fatigue, Tmax 100.3  Blood cultures negative so far.  Sputum culture pending  RVP negative  Legionella Ag negative      Vital Signs Last 24 Hrs  T(C): 36.4 (17 May 2019 05:28), Max: 37.9 (17 May 2019 00:41)  T(F): 97.5 (17 May 2019 05:28), Max: 100.3 (17 May 2019 00:41)  HR: 86 (17 May 2019 05:28) (83 - 94)  BP: 129/76 (17 May 2019 05:28) (111/68 - 142/74)  BP(mean): --  RR: 18 (17 May 2019 05:28) (18 - 18)  SpO2: 94% (17 May 2019 05:28) (93% - 95%)  Daily Height in cm: 182.88 (16 May 2019 07:55)    Daily Weight in k (17 May 2019 05:28)    16 @ 07:01  -  05-17 @ 07:00  --------------------------------------------------------  IN: 2050 mL / OUT: 100 mL / NET: 1950 mL      PHYSICAL EXAM:      Constitutional: no chills.  looks fatigued, non toxic    Eyes: Anicteric    Neck:  No nodes, no increased JVP    Respiratory:  RLL crackles/ ronchi    Cardiovascular: RRR nl S1S2, soft systolic M.    Gastrointestinal: Soft, NT, no mass    Extremities: No c/c/e    Neurological: No global or focal deficits    Skin:  Pale, warm, dry    Lymph Nodes: None palpable                                  14.1   16.83 )-----------( 170      ( 16 May 2019 09:02 )             42.2     05-16    135  |  104  |  18  ----------------------------<  111<H>  3.9   |  22  |  0.99    Ca    9.0      16 May 2019 05:55    TPro  7.6  /  Alb  4.0  /  TBili  1.7<H>  /  DBili  x   /  AST  39  /  ALT  39  /  AlkPhos  57  05-15          MEDICATIONS  (STANDING):  aspirin enteric coated 81 milliGRAM(s) Oral daily  docusate sodium 100 milliGRAM(s) Oral three times a day  enoxaparin Injectable 40 milliGRAM(s) SubCutaneous every 24 hours  levoFLOXacin IVPB 750 milliGRAM(s) IV Intermittent every 24 hours  senna 2 Tablet(s) Oral at bedtime  sodium chloride 0.9%. 1000 milliLiter(s) (100 mL/Hr) IV Continuous <Continuous>  vancomycin  IVPB 1250 milliGRAM(s) IV Intermittent every 12 hours    MEDICATIONS  (PRN):  acetaminophen   Tablet .. 650 milliGRAM(s) Oral every 6 hours PRN Moderate Pain (4 - 6)  polyethylene glycol 3350 17 Gram(s) Oral daily PRN Constipation    Impression    Community acquired pneumonia.  Stable oxygenation. No respiratory distress or acid-base disorder.  Leukocytosis persists with decreasing overall fever curve  BC negative.  If Dr. Zhou agrees can D/C vancomycin  SC pending final, RVP negative, Legionella Ag negative  h/o AVR ( 2 years)    Plan    Continue IV Levaquin a  O2 as needed to keep sats > 92%  VTE prophylaxis- OOB  f/u blood and sputum cultures  IVF and encourage po intake.  Pulmonology f/u appreciated      Sanjeev Alfaro MD  [phone]
Feeling better, stronger, no fevers.  CT result noted.  d/w pt wife Cristy.  She is obtaining disc of CT done 4/30 as OP for comparison here.  D/W Dr. Zhou.    	  Vital Signs Last 24 Hrs  T(C): 37.4 (20 May 2019 04:33), Max: 37.4 (20 May 2019 04:33)  T(F): 99.4 (20 May 2019 04:33), Max: 99.4 (20 May 2019 04:33)  HR: 74 (20 May 2019 04:33) (70 - 79)  BP: 112/64 (20 May 2019 04:33) (108/58 - 123/73)  BP(mean): --  RR: 18 (20 May 2019 04:33) (18 - 18)  SpO2: 98% (20 May 2019 04:33) (94% - 98%)  Daily     Daily     05-19 @ 07:01  -  05-20 @ 07:00  --------------------------------------------------------  IN: 2100 mL / OUT: 0 mL / NET: 2100 mL    PHYSICAL EXAM:      Constitutional: no chills.  looks fatigued, non toxic    Eyes: Anicteric    Neck:  No nodes, no increased JVP    Respiratory:  RLL crackles/ ronchi    Cardiovascular: RRR nl S1S2, soft systolic M.    Gastrointestinal: Soft, NT, no mass    Extremities: No c/c/e    Neurological: No global or focal deficits    Skin:  Pale, warm, dry    Lymph Nodes: None palpable                                    14.1   10.16 )-----------( 302      ( 19 May 2019 09:17 )             42.2     05-19    138  |  106  |  13  ----------------------------<  114<H>  3.9   |  22  |  0.92    Ca    9.2      19 May 2019 05:43            MEDICATIONS  (STANDING):  aspirin enteric coated 81 milliGRAM(s) Oral daily  docusate sodium 100 milliGRAM(s) Oral three times a day  enoxaparin Injectable 40 milliGRAM(s) SubCutaneous every 24 hours  levoFLOXacin IVPB 750 milliGRAM(s) IV Intermittent every 24 hours  senna 2 Tablet(s) Oral at bedtime  sodium chloride 0.9%. 1000 milliLiter(s) (100 mL/Hr) IV Continuous <Continuous>  vancomycin  IVPB 1250 milliGRAM(s) IV Intermittent every 8 hours    MEDICATIONS  (PRN):  acetaminophen   Tablet .. 650 milliGRAM(s) Oral every 6 hours PRN Moderate Pain (4 - 6)  polyethylene glycol 3350 17 Gram(s) Oral daily PRN Constipation      Impression  CAP RLL, RML infiltrates with calcified RLL nodules and subpleural nodules.  Improved clinically    Plan  Comparison of [facility] and OP CT  Pulmonology f/u- Dr. Zhou  D/C planning if pulmonary agrees.      Sanjeev Alfaro MD  [phone]
[facility] - [facility] - PROGRESS NOTE    CHIEF COMPLAINT: pneumonia; pleurisy; fever/chills    INTERVAL HISTORY: clinically much improved - no fatigue, malaise or weakness -  mild lower right chest wall pain exacerbated by deep inspiration - no cough or sputum production - no fever, chills or sweats - good appetite; blood cultures are negative; no chest congestion or wheeze; no anterior chest pain/pressure or palpitations; resolving leukocytosis    REVIEW OF SYSTEMS:  Constitutional: As per interval history  HEENT: Within normal limits  CV: As per interval history  Resp: As per interval history  GI: Within normal limits   : Within normal limits  Musculoskeletal: right sided chest pain  Skin: Within normal limits  Neurological: Within normal limits  Psychiatric: Within normal limits  Endocrine: Within normal limits  Hematologic/Lymphatic: Within normal limits  Allergic/Immunologic: Within normal limits    MEDICATIONS:     Pulmonary "    Anti-microbials:  levoFLOXacin  Tablet 750 milliGRAM(s) Oral every 24 hours    Cardiovascular:    Other:  aspirin enteric coated 81 milliGRAM(s) Oral daily  docusate sodium 100 milliGRAM(s) Oral three times a day  enoxaparin Injectable 40 milliGRAM(s) SubCutaneous every 24 hours  senna 2 Tablet(s) Oral at bedtime  sodium chloride 0.9%. 1000 milliLiter(s) IV Continuous <Continuous>    MEDICATIONS  (PRN):  acetaminophen   Tablet .. 650 milliGRAM(s) Oral every 6 hours PRN Moderate Pain (4 - 6)  polyethylene glycol 3350 17 Gram(s) Oral daily PRN Constipation        OBJECTIVE:    I&O's Detail    19 May 2019 07:01  -  20 May 2019 07:00  --------------------------------------------------------  IN:    IV PiggyBack: 900 mL    Oral Fluid: 1200 mL  Total IN: 2100 mL    OUT:  Total OUT: 0 mL    Total NET: 2100 mL    PHYSICAL EXAM:       ICU Vital Signs Last 24 Hrs  T(C): 37 (20 May 2019 10:32), Max: 37.4 (20 May 2019 04:33)  T(F): 98.6 (20 May 2019 10:32), Max: 99.4 (20 May 2019 04:33)  HR: 80 (20 May 2019 10:32) (70 - 80)  BP: 147/75 (20 May 2019 10:32) (108/58 - 147/75)  BP(mean): --  ABP: --  ABP(mean): --  RR: 18 (20 May 2019 10:32) (18 - 18)  SpO2: 96% (20 May 2019 10:32) (94% - 98%) on room air     General: Awake. Alert. Cooperative. No distress. Appears stated age. 	  HEENT: Atraumatic. Normocephalic. Anicteric. Normal oral mucosa. PERRL. EOMI.  Neck: Supple. Trachea midline. Thyroid without enlargement/tenderness/nodules. No carotid bruit. No JVD.	  Cardiovascular: Regular rate and rhythm. S1 S2 normal. II/VI systolic murmur  Respiratory: Respirations unlabored. Right basilar rales with egophony. No curvature.  Abdomen: Soft. Non-tender. Non-distended. No organomegaly. No masses. Normal bowel sounds.  Extremities: Warm to touch. No clubbing or cyanosis. No pedal edema.  Pulses: 2+ peripheral pulses all extremities.	  Skin: Normal skin color. No rashes or lesions. No ecchymoses. No cyanosis. Warm to touch.  Lymph Nodes: Cervical, supraclavicular and axillary nodes normal  Neurological: Motor and sensory examination equal and normal. A and O x 3  Psychiatry: Appropriate mood and affect.    LABS:                          14.1   10.16 )-----------( 302      ( 19 May 2019 09:17 )             42.2     CBC    WBC  10.16 <==, 12.2 <==, 16.66 <==, 16.83 <==, 17.4 <==    Hemoglobin  14.1 <<==, 14.0 <<==, 13.7 <<==, 14.1 <<==, 14.8 <<==    Hematocrit  42.2 <==, 41.6 <==, 41.5 <==, 42.2 <==, 43.9 <==    Platelets  302 <==, 284 <==, 216 <==, 170 <==, 198 <==      138  |  106  |  13  ----------------------------<  114<H>    05-19  3.9   |  22  |  0.92      LYTES    sodium  138 <==, 135 <==, 133 <==    potassium   3.9 <==, 3.9 <==, 4.3 <==    chloride  106 <==, 104 <==, 99 <==    carbon dioxide  22 <==, 22 <==, 21 <==    =============================================================================================  RENAL FUNCTION:    Creatinine:   0.92  <<==, 0.99  <<==, 1.07  <<==    BUN:   13 <==, 18 <==, 23 <==    ============================================================================================    calcium   9.2 <==, 9.0 <==, 9.4 <==    ============================================================================================  LFTs    AST:   39 <==     ALT:  39  <==     AP:  57  <=    Bili:  1.7  <=    Venous Blood Gas:  05-15 @ 23:07  7.43/37/43/24/76  VBG Lactate: 2.1    MICROBIOLOGY:     Legionella pneumophila Antigen, Urine (05.16.19 @ 09:08)    Legionella Antigen, Urine: Negative    Rapid Respiratory Viral Panel (05.16.19 @ 00:12)    Rapid RVP Result: NotDete: This Respiratory Panel uses polymerase chain reaction (PCR) to detect for  adenovirus; coronavirus (HKU1, NL63, 229E, OC43); human metapneumovirus  (hMPV); human enterovirus/rhinovirus (Entero/RV); influenza A; influenza  A/H1; influenza A/H3; influenza A/H1-2009; influenza B; parainfluenza  viruses 1, 2, 3, 4; respiratory syncytial virus; Mycoplasma pneumoniae;  and Chlamydophila pneumoniae.    Culture - Sputum . (05.17.19 @ 00:49)    Gram Stain:   Numerous polymorphonuclear leukocytes per low power field  No Squamous epithelial cells per low power field  Numerous Gram Negative Coccobacilli per oil power field    Specimen Source: .Sputum    Culture Results:   Normal Respiratory Elizabeth present    Culture - Blood (05.16.19 @ 10:13)    Specimen Source: .Blood    Culture Results:   No growth to date.    Culture - Blood (05.16.19 @ 10:13)    Specimen Source: .Blood    Culture Results:   No growth to date.    Culture - Blood (05.16.19 @ 00:53)    Specimen Source: .Blood    Culture Results:   No growth to date.    RADIOLOGY:  [x] Chest radiographs reviewed and interpreted by me    EXAM:  CT CHEST                          PROCEDURE DATE:  05/18/2019      INTERPRETATION:  CT CHEST WITHOUT CONTRAST    INDICATION: History of fever. Evaluate for pneumonia. One-week history of   cough.    TECHNIQUE: Unenhanced helical images were obtained of the chest. Coronal   and sagittal images were reconstructed. Maximum intensity projection   images were generated.     COMPARISON: Radiograph 5/15/2019.    FINDINGS:     Tubes/Lines: None.    Lungs And Airways: Within the right lower lobe (predominantly involving   the superior segment of the right lower lobe) and to a lesser extent the   right the middle lobe are patchy foci of consolidation. The remainder of   the lungs are clear. Calcified nodules in the right lower lobe.   Subpleural cysts in the bilateral lower lobes. The remainder of the lungs   and airways are unremarkable.      Pleura: No pneumothorax. No pleural effusion.    Mediastinum: Nonenlarged prevascular, AP window, bilateral upper and   lower paratracheal, and subcarinal lymph nodes. The visualized portion of   the thyroid gland is unremarkable. The esophagus is unremarkable.    Heart and Vasculature: The heart is normal in size. Coronary artery   calcifications of the right coronary artery. The aorta is normal in   caliber. The pulmonary artery is normal in caliber. Status post aortic   valve in valve procedure.    Upper Abdomen: The upper abdomen is unremarkable.    Bones And Soft Tissues: The bones are unremarkable.  The soft tissues are   unremarkable.      IMPRESSION:     1.  The right lower lobe opacities and to a lesser extent opacities in   the right middle lobe can occur in the clinical setting of pneumonia,   however, other entities could look similar. If prior studies are   submitted for review, an addendum can be generated.    JAKE MUSTAFA M.D., ATTENDING RADIOLOGIST  This document has been electronically signed. May 18 2019  1:13PM  ---------------------------------------------------------------------------------------------------------------  EXAM:  XR CHEST PA LAT 2V                          PROCEDURE DATE:  05/15/2019      INTERPRETATION:  CLINICAL INFORMATION: cough    EXAM: PA and lateral chest radiographs    COMPARISON: Chest radiograph from 1/31/2008.    FINDINGS:    Status post median sternotomy and aortic valve replacement. Retained   epicardial pacer leads are noted.  Right lower lobe opacity consistent with pneumonia.  The cardiac silhouette is within normal limits.  There is no acute abnormality of the visualized osseous structures.    IMPRESSION:     Right lower lobe pneumonia. Follow-up to resolution.    MARC AREVALO M.D., RADIOLOGY RESIDENT  This document has been electronically signed.  HAYLIE DYSON M.D., ATTENDING RADIOLOGIST  This document has been electronically signed. May 16 2019  8:56AM  ---------------------------------------------------------------------------------------------------------------
[facility] - PROGRESS NOTE    CHIEF COMPLAINT: pneumonia; pleurisy; fever/chills    INTERVAL HISTORY: clinically much improved - no fatigue, malaise or weakness -  mild right sided upper quadrant pain radiating to the mid back - no cough or sputum production - no fever, chills or sweats - better appetite; blood cultures are negative; no chest congestion or wheeze; no anterior chest pain/pressure or palpitations; moderating leukocytosis    REVIEW OF SYSTEMS:  Constitutional: As per interval history  HEENT: Within normal limits  CV: As per interval history  Resp: As per interval history  GI: Within normal limits   : Within normal limits  Musculoskeletal:  right sided pain  Skin: Within normal limits  Neurological: Within normal limits  Psychiatric: Within normal limits  Endocrine: Within normal limits  Hematologic/Lymphatic: Within normal limits  Allergic/Immunologic: Within normal limits    MEDICATIONS:     Pulmonary "    Anti-microbials:  levoFLOXacin IVPB 750 milliGRAM(s) IV Intermittent every 24 hours  vancomycin  IVPB 1250 milliGRAM(s) IV Intermittent every 8 hours    Cardiovascular:    Other:  aspirin enteric coated 81 milliGRAM(s) Oral daily  docusate sodium 100 milliGRAM(s) Oral three times a day  enoxaparin Injectable 40 milliGRAM(s) SubCutaneous every 24 hours  senna 2 Tablet(s) Oral at bedtime  sodium chloride 0.9%. 1000 milliLiter(s) IV Continuous <Continuous>    MEDICATIONS  (PRN):  acetaminophen   Tablet .. 650 milliGRAM(s) Oral every 6 hours PRN Moderate Pain (4 - 6)  polyethylene glycol 3350 17 Gram(s) Oral daily PRN Constipation        OBJECTIVE:    I&O's Detail    18 May 2019 07:01  -  19 May 2019 07:00  --------------------------------------------------------  IN:    IV PiggyBack: 400 mL    Oral Fluid: 1450 mL  Total IN: 1850 mL    OUT:  Total OUT: 0 mL    Total NET: 1850 mL    PHYSICAL EXAM:       ICU Vital Signs Last 24 Hrs  T(C): 36.2 (19 May 2019 11:01), Max: 37.7 (18 May 2019 21:03)  T(F): 97.2 (19 May 2019 11:01), Max: 99.9 (18 May 2019 21:03)  HR: 70 (19 May 2019 11:01) (69 - 82)  BP: 108/70 (19 May 2019 11:01) (108/70 - 132/73)  BP(mean): --  ABP: --  ABP(mean): --  RR: 18 (19 May 2019 11:01) (18 - 18)  SpO2: 94% (19 May 2019 11:01) (94% - 99%) on room air     General: Awake. Alert. Cooperative. No distress. Appears stated age. 	  HEENT: Atraumatic. Normocephalic. Anicteric. Normal oral mucosa. PERRL. EOMI.  Neck: Supple. Trachea midline. Thyroid without enlargement/tenderness/nodules. No carotid bruit. No JVD.	  Cardiovascular: Regular rate and rhythm. S1 S2 normal. II/VI systolic murmur  Respiratory: Respirations unlabored. Right basilar rales with egophony. No curvature.  Abdomen: Soft. Non-tender. Non-distended. No organomegaly. No masses. Normal bowel sounds.  Extremities: Warm to touch. No clubbing or cyanosis. No pedal edema.  Pulses: 2+ peripheral pulses all extremities.	  Skin: Normal skin color. No rashes or lesions. No ecchymoses. No cyanosis. Warm to touch.  Lymph Nodes: Cervical, supraclavicular and axillary nodes normal  Neurological: Motor and sensory examination equal and normal. A and O x 3  Psychiatry: Appropriate mood and affect.    LABS:                          14.1   10.16 )-----------( 302      ( 19 May 2019 09:17 )             42.2     CBC    WBC  10.16 <==, 12.2 <==, 16.66 <==, 16.83 <==, 17.4 <==    Hemoglobin  14.1 <<==, 14.0 <<==, 13.7 <<==, 14.1 <<==, 14.8 <<==    Hematocrit  42.2 <==, 41.6 <==, 41.5 <==, 42.2 <==, 43.9 <==    Platelets  302 <==, 284 <==, 216 <==, 170 <==, 198 <==      138  |  106  |  13  ----------------------------<  114<H>    05-19  3.9   |  22  |  0.92      LYTES    sodium  138 <==, 135 <==, 133 <==    potassium   3.9 <==, 3.9 <==, 4.3 <==    chloride  106 <==, 104 <==, 99 <==    carbon dioxide  22 <==, 22 <==, 21 <==    =============================================================================================  RENAL FUNCTION:    Creatinine:   0.92  <<==, 0.99  <<==, 1.07  <<==    BUN:   13 <==, 18 <==, 23 <==    ============================================================================================    calcium   9.2 <==, 9.0 <==, 9.4 <==    ============================================================================================  LFTs    AST:   39 <==     ALT:  39  <==     AP:  57  <=    Bili:  1.7  <=    Venous Blood Gas:  05-15 @ 23:07  7.43/37/43/24/76  [facility] Lactate: 2.1    MICROBIOLOGY:     Legionella pneumophila Antigen, Urine (05.16.19 @ 09:08)    Legionella Antigen, Urine: Negative    Rapid Respiratory Viral Panel (05.16.19 @ 00:12)    Rapid RVP Result: NotDetec: This Respiratory Panel uses polymerase chain reaction (PCR) to detect for  adenovirus; coronavirus (HKU1, NL63, 229E, OC43); human metapneumovirus  (hMPV); human enterovirus/rhinovirus (Entero/RV); influenza A; influenza  A/H1; influenza A/H3; influenza A/H1-2009; influenza B; parainfluenza  viruses 1, 2, 3, 4; respiratory syncytial virus; Mycoplasma pneumoniae;  and Chlamydophila pneumoniae.    Culture - Sputum . (05.17.19 @ 00:49)    Gram Stain:   Numerous polymorphonuclear leukocytes per low power field  No Squamous epithelial cells per low power field  Numerous Gram Negative Coccobacilli per oil power field    Specimen Source: .Sputum    Culture Results:   Normal Respiratory Elizabeth present    Culture - Blood (05.16.19 @ 10:13)    Specimen Source: .Blood    Culture Results:   No growth to date.    Culture - Blood (05.16.19 @ 10:13)    Specimen Source: .Blood    Culture Results:   No growth to date.    Culture - Blood (05.16.19 @ 00:53)    Specimen Source: .Blood    Culture Results:   No growth to date.    RADIOLOGY:  [x] Chest radiographs reviewed and interpreted by me      EXAM:  CT CHEST                          PROCEDURE DATE:  05/18/2019      INTERPRETATION:  CT CHEST WITHOUT CONTRAST    INDICATION: History of fever. Evaluate for pneumonia. One-week history of   cough.    TECHNIQUE: Unenhanced helical images were obtained of the chest. Coronal   and sagittal images were reconstructed. Maximum intensity projection   images were generated.     COMPARISON: Radiograph 5/15/2019.    FINDINGS:     Tubes/Lines: None.    Lungs And Airways: Within the right lower lobe (predominantly involving   the superior segment of the right lower lobe) and to a lesser extent the   right the middle lobe are patchy foci of consolidation. The remainder of   the lungs are clear. Calcified nodules in the right lower lobe.   Subpleural cysts in the bilateral lower lobes. The remainder of the lungs   and airways are unremarkable.      Pleura: No pneumothorax. No pleural effusion.    Mediastinum: Nonenlarged prevascular, AP window, bilateral upper and   lower paratracheal, and subcarinal lymph nodes. The visualized portion of   the thyroid gland is unremarkable. The esophagus is unremarkable.    Heart and Vasculature: The heart is normal in size. Coronary artery   calcifications of the right coronary artery. The aorta is normal in   caliber. The pulmonary artery is normal in caliber. Status post aortic   valve in valve procedure.    Upper Abdomen: The upper abdomen is unremarkable.    Bones And Soft Tissues: The bones are unremarkable.  The soft tissues are   unremarkable.      IMPRESSION:     1.  The right lower lobe opacities and to a lesser extent opacities in   the right middle lobe can occur in the clinical setting of pneumonia,   however, other entities could look similar. If prior studies are   submitted for review, an addendum can be generated.    JAKE MUSTAFA M.D., ATTENDING RADIOLOGIST  This document has been electronically signed. May 18 2019  1:13PM  ---------------------------------------------------------------------------------------------------------------  EXAM:  XR CHEST PA LAT 2V                          PROCEDURE DATE:  05/15/2019      INTERPRETATION:  CLINICAL INFORMATION: cough    EXAM: PA and lateral chest radiographs    COMPARISON: Chest radiograph from 1/31/2008.    FINDINGS:    Status post median sternotomy and aortic valve replacement. Retained   epicardial pacer leads are noted.  Right lower lobe opacity consistent with pneumonia.  The cardiac silhouette is within normal limits.  There is no acute abnormality of the visualized osseous structures.    IMPRESSION:     Right lower lobe pneumonia. Follow-up to resolution.    MARC AREVALO M.D., RADIOLOGY RESIDENT  This document has been electronically signed.  HAYLIE DYSON M.D., ATTENDING RADIOLOGIST  This document has been electronically signed. May 16 2019  8:56AM  ---------------------------------------------------------------------------------------------------------------
[facility] - PROGRESS NOTE    CHIEF COMPLAINT: pneumonia; pleurisy; fever/chills    INTERVAL HISTORY: clinically much improved - decreased fatigue, malaise and weakness - less right sided pleuritic chest pain - less cough productive of scant - low grade fever overnight without chills or sweats - better appetite; blood cultures are negative; no chest congestion or wheeze; no anterior chest pain/pressure or palpitations;    REVIEW OF SYSTEMS:  Constitutional: As per interval history  HEENT: Within normal limits  CV: As per interval history  Resp: As per interval history  GI: Within normal limits   : Within normal limits  Musculoskeletal:  right sided chest pain  Skin: Within normal limits  Neurological: Within normal limits  Psychiatric: Within normal limits  Endocrine: Within normal limits  Hematologic/Lymphatic: Within normal limits  Allergic/Immunologic: Within normal limits      MEDICATIONS:     Pulmonary "    Anti-microbials:  levoFLOXacin IVPB 750 milliGRAM(s) IV Intermittent every 24 hours  vancomycin  IVPB 1250 milliGRAM(s) IV Intermittent every 12 hours    Cardiovascular:    Other:  aspirin enteric coated 81 milliGRAM(s) Oral daily  docusate sodium 100 milliGRAM(s) Oral three times a day  enoxaparin Injectable 40 milliGRAM(s) SubCutaneous every 24 hours  senna 2 Tablet(s) Oral at bedtime  sodium chloride 0.9%. 1000 milliLiter(s) IV Continuous <Continuous>    MEDICATIONS  (PRN):  acetaminophen   Tablet .. 650 milliGRAM(s) Oral every 6 hours PRN Moderate Pain (4 - 6)  polyethylene glycol 3350 17 Gram(s) Oral daily PRN Constipation        OBJECTIVE:    I&O's Detail    16 May 2019 07:  -  17 May 2019 07:00  --------------------------------------------------------  IN:    IV PiggyBack: 250 mL    Oral Fluid: 1200 mL    sodium chloride 0.9%.: 600 mL  Total IN: 2050 mL    OUT:    Voided: 100 mL  Total OUT: 100 mL    Total NET: 1950 mL      17 May 2019 07:  -  17 May 2019 13:01  --------------------------------------------------------  IN:    Oral Fluid: 360 mL  Total IN: 360 mL    OUT:  Total OUT: 0 mL    Total NET: 360 mL    Daily Weight in k (17 May 2019 05:28)    PHYSICAL EXAM:       ICU Vital Signs Last 24 Hrs  T(C): 37.1 (17 May 2019 09:20), Max: 37.9 (17 May 2019 00:41)  T(F): 98.8 (17 May 2019 09:20), Max: 100.3 (17 May 2019 00:41)  HR: 75 (17 May 2019 09:20) (75 - 94)  BP: 124/71 (17 May 2019 09:20) (111/68 - 142/74)  BP(mean): --  ABP: --  ABP(mean): --  RR: 18 (17 May 2019 09:20) (18 - 18)  SpO2: 95% (17 May 2019 09:20) (93% - 95%) on room air     General: Awake. Alert. Cooperative. No distress. Appears stated age. 	  HEENT: Atraumatic. Normocephalic. Anicteric. Normal oral mucosa. PERRL. EOMI.  Neck: Supple. Trachea midline. Thyroid without enlargement/tenderness/nodules. No carotid bruit. No JVD.	  Cardiovascular: Regular rate and rhythm. S1 S2 normal. II/VI systolic murmur  Respiratory: Respirations unlabored. Bibasilar rales right > left. Minimal pain with inspiration. No curvature.  Abdomen: Soft. Non-tender. Non-distended. No organomegaly. No masses. Normal bowel sounds.  Extremities: Warm to touch. No clubbing or cyanosis. No pedal edema.  Pulses: 2+ peripheral pulses all extremities.	  Skin: Normal skin color. No rashes or lesions. No ecchymoses. No cyanosis. Warm to touch.  Lymph Nodes: Cervical, supraclavicular and axillary nodes normal  Neurological: Motor and sensory examination equal and normal. A and O x 3  Psychiatry: Appropriate mood and affect.    LABS:                          13.7   16.66 )-----------( 216      ( 17 May 2019 09:17 )             41.5     CBC    WBC  16.66 <==, 16.83 <==, 17.4 <==    Hemoglobin  13.7 <<==, 14.1 <<==, 14.8 <<==    Hematocrit  41.5 <==, 42.2 <==, 43.9 <==    Platelets  216 <==, 170 <==, 198 <==      135  |  104  |  18  ----------------------------<  111<H>    05-16  3.9   |  22  |  0.99      LYTES    sodium  135 <==, 133 <==    potassium   3.9 <==, 4.3 <==    chloride  104 <==, 99 <==    carbon dioxide  22 <==, 21 <==    =============================================================================================  RENAL FUNCTION:    Creatinine:   0.99  <<==, 1.07  <<==    BUN:   18 <==, 23 <==    ============================================================================================    calcium   9.0 <==, 9.4 <==      ============================================================================================  LFTs    AST:   39 <==     ALT:  39  <==     AP:  57  <=    Bili:  1.7  <=    Venous Blood Gas:  05-15 @ 23:07  7.43/37/43/24/76  [facility] Lactate: 2.1    MICROBIOLOGY:     Legionella pneumophila Antigen, Urine (19 @ 09:08)    Legionella Antigen, Urine: Negative    Rapid Respiratory Viral Panel (19 @ 00:12)    Rapid RVP Result: NotDete: This Respiratory Panel uses polymerase chain reaction (PCR) to detect for  adenovirus; coronavirus (HKU1, NL63, 229E, OC43); human metapneumovirus  (hMPV); human enterovirus/rhinovirus (Entero/RV); influenza A; influenza  A/H1; influenza A/H3; influenza A/H1-2009; influenza B; parainfluenza  viruses 1, 2, 3, 4; respiratory syncytial virus; Mycoplasma pneumoniae;  and Chlamydophila pneumoniae.    Culture - Sputum . (19 @ 00:49)    Gram Stain:   Numerous polymorphonuclear leukocytes per low power field  No Squamous epithelial cells per low power field  Numerous Gram Negative Coccobacilli per oil power field    Specimen Source: .Sputum    Culture - Blood (19 @ 10:13)    Specimen Source: .Blood    Culture Results:   No growth to date.    Culture - Blood (19 @ 10:13)    Specimen Source: .Blood    Culture Results:   No growth to date.    Culture - Blood (19 @ 00:53)    Specimen Source: .Blood    Culture Results:   No growth to date.    RADIOLOGY:  [x] Chest radiographs reviewed and interpreted by me    EXAM:  XR CHEST PA LAT 2V                          PROCEDURE DATE:  05/15/2019      INTERPRETATION:  CLINICAL INFORMATION: cough    EXAM: PA and lateral chest radiographs    COMPARISON: Chest radiograph from 2008.    FINDINGS:    Status post median sternotomy and aortic valve replacement. Retained   epicardial pacer leads are noted.  Right lower lobe opacity consistent with pneumonia.  The cardiac silhouette is within normal limits.  There is no acute abnormality of the visualized osseous structures.    IMPRESSION:     Right lower lobe pneumonia. Follow-up to resolution.    MARC AREVALO M.D., RADIOLOGY RESIDENT  This document has been electronically signed.  HAYLIE DYSON M.D., ATTENDING RADIOLOGIST  This document has been electronically signed. May 16 2019  8:56AM  ---------------------------------------------------------------------------------------------------------------
REVIEW OF SYSTEMS:  GEN: no fever,    no chills  RESP: no SOB,   no cough  CVS: no chest pain,   no palpitations  GI: no abdominal pain,   no nausea,   no vomiting,   no constipation,   no diarrhea  : no dysuria,   no frequency  NEURO: no headache,   no dizziness  PSYCH: no depression,   not anxious  Derm : no rash    MEDICATIONS  (STANDING):  aspirin enteric coated 81 milliGRAM(s) Oral daily  docusate sodium 100 milliGRAM(s) Oral three times a day  enoxaparin Injectable 40 milliGRAM(s) SubCutaneous every 24 hours  levoFLOXacin IVPB 750 milliGRAM(s) IV Intermittent every 24 hours  senna 2 Tablet(s) Oral at bedtime  sodium chloride 0.9%. 1000 milliLiter(s) (100 mL/Hr) IV Continuous <Continuous>  vancomycin  IVPB 1250 milliGRAM(s) IV Intermittent every 8 hours    MEDICATIONS  (PRN):  acetaminophen   Tablet .. 650 milliGRAM(s) Oral every 6 hours PRN Moderate Pain (4 - 6)  polyethylene glycol 3350 17 Gram(s) Oral daily PRN Constipation      Vital Signs Last 24 Hrs  T(C): 36.7 (18 May 2019 06:07), Max: 37.5 (17 May 2019 21:05)  T(F): 98.1 (18 May 2019 06:07), Max: 99.5 (17 May 2019 21:05)  HR: 85 (18 May 2019 06:07) (75 - 86)  BP: 124/76 (18 May 2019 06:07) (118/65 - 126/71)  BP(mean): --  RR: 18 (18 May 2019 06:07) (18 - 18)  SpO2: 96% (18 May 2019 06:07) (95% - 97%)  CAPILLARY BLOOD GLUCOSE        I&O's Summary    17 May 2019 07:01  -  18 May 2019 07:00  --------------------------------------------------------  IN: 2080 mL / OUT: 0 mL / NET: 2080 mL        PHYSICAL EXAM:  HEAD:  Atraumatic, Normocephalic  NECK: Supple, No   JVD  CHEST/LUNG:   no     rales,     no,    rhonchi  HEART: Regular rate and rhythm;         murmur  ABDOMEN: Soft, Nontender, ;   EXTREMITIES:    no    edema  NEUROLOGY:  alert    LABS:                        13.7   16.66 )-----------( 216      ( 17 May 2019 09:17 )             41.5                                   Consultant(s) Notes Reviewed:      Care Discussed with Consultants/Other Providers:    no cp mild  cough
less  cough/ no sob    REVIEW OF SYSTEMS:  GEN: no fever,    no chills  RESP: no SOB,   no cough  CVS: no chest pain,   no palpitations  GI: no abdominal pain,   no nausea,   no vomiting,   no constipation,   no diarrhea  : no dysuria,   no frequency  NEURO: no headache,   no dizziness  PSYCH: no depression,   not anxious  Derm : no rash    MEDICATIONS  (STANDING):  aspirin enteric coated 81 milliGRAM(s) Oral daily  docusate sodium 100 milliGRAM(s) Oral three times a day  enoxaparin Injectable 40 milliGRAM(s) SubCutaneous every 24 hours  levoFLOXacin IVPB 750 milliGRAM(s) IV Intermittent every 24 hours  senna 2 Tablet(s) Oral at bedtime  sodium chloride 0.9%. 1000 milliLiter(s) (100 mL/Hr) IV Continuous <Continuous>  vancomycin  IVPB 1250 milliGRAM(s) IV Intermittent every 8 hours    MEDICATIONS  (PRN):  acetaminophen   Tablet .. 650 milliGRAM(s) Oral every 6 hours PRN Moderate Pain (4 - 6)  polyethylene glycol 3350 17 Gram(s) Oral daily PRN Constipation      Vital Signs Last 24 Hrs  T(C): 37.4 (19 May 2019 05:45), Max: 37.7 (18 May 2019 21:03)  T(F): 99.4 (19 May 2019 05:45), Max: 99.9 (18 May 2019 21:03)  HR: 78 (19 May 2019 05:45) (69 - 82)  BP: 125/70 (19 May 2019 05:45) (114/71 - 132/73)  BP(mean): --  RR: 18 (19 May 2019 05:45) (18 - 18)  SpO2: 99% (19 May 2019 05:45) (95% - 99%)  CAPILLARY BLOOD GLUCOSE        I&O's Summary    18 May 2019 07:01  -  19 May 2019 07:00  --------------------------------------------------------  IN: 1850 mL / OUT: 0 mL / NET: 1850 mL        PHYSICAL EXAM:  HEAD:  Atraumatic, Normocephalic  NECK: Supple, No   JVD  CHEST/LUNG:   no     rales,     no,    rhonchi  HEART: Regular rate and rhythm;         murmur  ABDOMEN: Soft, Nontender, ;   EXTREMITIES:    no    edema  NEUROLOGY:  alert    LABS:                        14.0   12.2  )-----------( 284      ( 18 May 2019 12:45 )             41.6     05-19    138  |  106  |  13  ----------------------------<  114<H>  3.9   |  22  |  0.92    Ca    9.2      19 May 2019 05:43                              Consultant(s) Notes Reviewed:      Care Discussed with Consultants/Other Providers:

## 2019-05-20 NOTE — DISCHARGE NOTE NURSING/CASE MANAGEMENT/SOCIAL WORK - [email or web address]_GEN_ALL_CORE
You can access the [facility] Portal, offered by [facility], by registering with the following website: [email or web address]

## 2019-05-20 NOTE — PROGRESS NOTE ADULT - ASSESSMENT
ASSESSMENT:    right lower lobe "pneumonia" with pleurisy, fever, chills, cough with sputum production and leukocytosis in the setting of a prosthetic aortic valve - of interest, an outside CT of the abdomen on April 11th and a follow-up chest CT on April 30th revealed a superior segment of the right lower lobe "process" and mild bibasilar fibrosis - the duration of radiographic abnormalities raises the question of a non-infection process such as an inflammatory pneumonia such as cryptogenetic organizing pneumonia - do not believe this represents a neoplastic process given the clinical presentation     PLAN/RECOMMENDATIONS:    stable oxygenation on room air  chest CT ordered by "hospitalist"  observe off pulmonary medications   vanco/levaquin for now - would discontinue vancomycin if outpatient blood cultures do not reveal staph sp. (which seems unlikely) - inpatient blood cultures are negative - urine Legionella antigen is negative - sputum culture -> normal respiratory yon  antipyretics  analgesics  DVT prophylaxis - SQ lovenox  bowel regimen  would like to review the recent outpatient abdominal and chest CT scans to compare with today's film    Will follow with you. Plan of care discussed with the patient at bedside.    Jim Zhou MD, [facility]  [phone]  Pulmonary Medicine
ASSESSMENT:    right lower lobe "pneumonia" with pleurisy, fever, chills, cough with sputum production and leukocytosis in the setting of a prosthetic aortic valve - of interest, an outside CT of the abdomen on April 11th and a follow-up chest CT on April 30th revealed a superior segment of the right lower lobe "process" and mild bibasilar fibrosis - the duration of radiographic abnormalities raises the question of a non-infectious process such as an inflammatory pneumonia such as cryptogenetic organizing pneumonia - do not believe this represents a neoplastic process given the clinical presentation     PLAN/RECOMMENDATIONS:    stable oxygenation on room air  will review the chest CT with chest radiologist  have asked the patient's wife to bring in the discs of recent CT scans so that they all can be reviewed together  observe off pulmonary medications   vanco/levaquin for now - would discontinue vancomycin if outpatient blood cultures do not reveal staph sp. (which seems unlikely) - inpatient blood cultures are negative - urine Legionella antigen is negative - sputum culture -> normal respiratory yon - no objection to transitioning to oral levaquin  antipyretics as needed  analgesics as needed  DVT prophylaxis - SQ lovenox  bowel regimen    Will follow with you. Plan of care discussed with the patient at bedside.    Jim Zhou MD, [facility]  [phone]  Pulmonary Medicine
ASSESSMENT:    right lower lobe pneumonia with pleurisy, fever, chills, cough with sputum production and leukocytosis in the setting of a prosthetic aortic valve - a small airspace consolidation in the right lower lobe was seen on the chest CT of April 30th (chest CTs reviewed with Dr. Sheron Grullon")    PLAN/RECOMMENDATIONS:    stable oxygenation on room air  observe off pulmonary medications   discontinue vancomycin - complete a 10 day course of levaquin (switch to po) - blood cultures are negative - urine Legionella antigen is negative - sputum culture -> normal respiratory yon   analgesics as needed  DVT prophylaxis - SQ lovenox  bowel regimen  follow-up radiographs in 4 - 6 weeks    Will follow with you. Plan of care discussed with the patient and his wife at bedside, the dedicated floor NP and Dr. Alfaro. No pulmonary objection to discharge. Will arrange follow-up with Dr. Monalisa Kidd in the next several weeks.     Jim Zhou MD, [facility]  [phone]  Pulmonary Medicine
ASSESSMENT:    right lower lobe pneumonia with pleurisy, fever, chills, cough with sputum production and leukocytosis in the setting of a prosthetic aortic valve - of interest, an outside chest CT on April 11th revealed a superior segment of the right lower lobe "process" and mild bibasilar fibrosis    PLAN/RECOMMENDATIONS:    stable oxygenation on room air  observe off pulmonary medications   vanco/levaquin for now - would discontinue vancomycin if outpatient blood cultures do not reveal staph sp. (which seems unlikely) - inpatient blood cultures are negative - urine Legionella antigen is negative - await sputum culture   off IV fluids  antipyretics  analgesics  DVT prophylaxis - SQ lovenox  bowel regimen  would like to review the recent outpatient chest CT    Will follow with you. Plan of care discussed with the patient and his family at bedside    Jim Zhou MD, [facility]  [phone]  Pulmonary Medicine
pt with h/o AVR, bioprosthetic,  G Celeste,     admitted with  right lower lobe pneumonia with pleurisy, fever, chills, cough and leukocytosis    h/o  AVR, prosthetic valve     CAP/  vanco/levaquin  wbc ic decreasing today  ct chest. with RML/ RLL  pt doing better   dvt ppx     < from: CT Chest No Cont (05.18.19 @ 11:55) >  IMPRESSION:   1.  The right lower lobe opacities and to a lesser extent opacities in   the right middle lobe can occur in the clinical setting of pneumonia,   however, other entities could look similar. If prior studies are   submitted for review, an addendum can be generated.  < end of copied text >
pt with h/o AVR, bioprosthetic,  G Nathan,     admitted with  right lower lobe pneumonia with pleurisy, fever, chills, cough and leukocytosis    h/o  AVR, prosthetic valve     CAP/  vanco/levaquin  wbc  of 16,000, is  persisting  labs  in am   dvt ppx

## 2019-05-20 NOTE — DISCHARGE NOTE PROVIDER - NSDCFUADDINST_GEN_ALL_CORE_FT
FOLLOW UP WITH DR VALDIVIA ON FRIDAY, MAY 25.  FOLLOW UP WITH DR Loredo AND dR Rowley IN 1 WEEK.  CALL TO SCHEDULE APPOINTMENTS.

## 2019-05-20 NOTE — PROGRESS NOTE ADULT - PROVIDER SPECIALTY LIST ADULT
Internal Medicine
Pulmonology
Internal Medicine
Pulmonology

## 2019-05-20 NOTE — DISCHARGE NOTE PROVIDER - CARE PROVIDERS DIRECT ADDRESSES
,DirectAddress_Unknown,[email or web address],DirectAddress_Unknown

## 2019-05-20 NOTE — PROVIDER CONTACT NOTE (OTHER) - ACTION/TREATMENT ORDERED:
PA notified. No new orders-not fever yet. Will continue to monitor.
PA notified. No new orders. Will continue to monitor.
Abi Nath made aware
NP Lupe Maurice made aware ,pain med to be ordered

## 2019-05-20 NOTE — DISCHARGE NOTE PROVIDER - HOSPITAL COURSE
63yoM with PMH of htn, aortic stenosis s/p bioprosthetic AVR, arthritis, remote hx of GBS (Hardin Hardin variant), ?SLE who presented to the ED with complaint of fever and cough after recent treatment for influenza, suspicious for secondary bacterial PNA.    right lower lobe pneumonia with pleurisy, fever, chills, cough with sputum production and leukocytosis in the setting of a prosthetic aortic valve - a small airspace consolidation in the right lower lobe was seen on the chest CT of April 30th (chest CTs reviewed with Dr. Sheron Grullon")        PLAN/RECOMMENDATIONS:    Stable oxygenation on room air.    observe off pulmonary medications     discontinue vancomycin - complete a 10 day course of Levaquin (switch to po) - blood cultures are negative - urine Legionella antigen is negative - sputum culture -> normal respiratory yon     analgesics as needed    DVT prophylaxis - SQ Lovenox    bowel regimen    Pt stable for discharge. To follow up with Pulmonologist Dr Kidd on Friday, 5/24.

## 2019-05-21 LAB
CULTURE RESULTS: SIGNIFICANT CHANGE UP
SPECIMEN SOURCE: SIGNIFICANT CHANGE UP

## 2019-12-25 NOTE — PROVIDER CONTACT NOTE (OTHER) - RECOMMENDATIONS
encouraged pt to drink fluids
per provider.
Abi Nath made aware
GENEVIEVE Maurice made aware
English